# Patient Record
Sex: FEMALE | Race: WHITE | Employment: STUDENT | URBAN - METROPOLITAN AREA
[De-identification: names, ages, dates, MRNs, and addresses within clinical notes are randomized per-mention and may not be internally consistent; named-entity substitution may affect disease eponyms.]

---
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
## 2018-01-26 DIAGNOSIS — R10.84 ABDOMINAL PAIN, GENERALIZED: ICD-10-CM

## 2018-01-26 LAB
ALBUMIN SERPL-MCNC: 3.3 G/DL (ref 3.4–5)
ALP SERPL-CCNC: 50 U/L (ref 40–150)
ALT SERPL W P-5'-P-CCNC: 21 U/L (ref 0–50)
AST SERPL W P-5'-P-CCNC: 18 U/L (ref 0–35)
BASOPHILS # BLD AUTO: 0 10E9/L (ref 0–0.2)
BASOPHILS NFR BLD AUTO: 0.7 %
BILIRUB DIRECT SERPL-MCNC: 0.2 MG/DL (ref 0–0.2)
BILIRUB SERPL-MCNC: 0.7 MG/DL (ref 0.2–1.3)
DEPRECATED CALCIDIOL+CALCIFEROL SERPL-MC: 50 UG/L (ref 20–75)
DIFFERENTIAL METHOD BLD: NORMAL
EOSINOPHIL # BLD AUTO: 0.2 10E9/L (ref 0–0.7)
EOSINOPHIL NFR BLD AUTO: 4 %
ERYTHROCYTE [DISTWIDTH] IN BLOOD BY AUTOMATED COUNT: 12.5 % (ref 10–15)
HCT VFR BLD AUTO: 43 % (ref 35–47)
HGB BLD-MCNC: 13.7 G/DL (ref 11.7–15.7)
IMM GRANULOCYTES # BLD: 0 10E9/L (ref 0–0.4)
IMM GRANULOCYTES NFR BLD: 0.2 %
LYMPHOCYTES # BLD AUTO: 1.6 10E9/L (ref 0.8–5.3)
LYMPHOCYTES NFR BLD AUTO: 36.1 %
MCH RBC QN AUTO: 30.4 PG (ref 26.5–33)
MCHC RBC AUTO-ENTMCNC: 31.9 G/DL (ref 31.5–36.5)
MCV RBC AUTO: 96 FL (ref 78–100)
MONOCYTES # BLD AUTO: 0.3 10E9/L (ref 0–1.3)
MONOCYTES NFR BLD AUTO: 6.8 %
NEUTROPHILS # BLD AUTO: 2.4 10E9/L (ref 1.6–8.3)
NEUTROPHILS NFR BLD AUTO: 52.2 %
NRBC # BLD AUTO: 0 10*3/UL
NRBC BLD AUTO-RTO: 0 /100
PLATELET # BLD AUTO: 206 10E9/L (ref 150–450)
PROT SERPL-MCNC: 7 G/DL (ref 6.8–8.8)
RBC # BLD AUTO: 4.5 10E12/L (ref 3.8–5.2)
TSH SERPL DL<=0.005 MIU/L-ACNC: 1.17 MU/L (ref 0.4–4)
WBC # BLD AUTO: 4.5 10E9/L (ref 4–11)

## 2018-01-27 LAB
ANION GAP SERPL CALCULATED.3IONS-SCNC: 7 MMOL/L (ref 3–14)
BUN SERPL-MCNC: 17 MG/DL (ref 7–30)
CALCIUM SERPL-MCNC: 8.7 MG/DL (ref 8.5–10.1)
CHLORIDE SERPL-SCNC: 109 MMOL/L (ref 96–110)
CO2 SERPL-SCNC: 26 MMOL/L (ref 20–32)
CREAT SERPL-MCNC: 1.09 MG/DL (ref 0.5–1)
GFR SERPL CREATININE-BSD FRML MDRD: 64 ML/MIN/1.7M2
GLUCOSE SERPL-MCNC: 72 MG/DL (ref 70–99)
POTASSIUM SERPL-SCNC: 4.4 MMOL/L (ref 3.4–5.3)
SODIUM SERPL-SCNC: 142 MMOL/L (ref 133–144)

## 2018-01-29 ENCOUNTER — OFFICE VISIT (OUTPATIENT)
Dept: FAMILY MEDICINE | Facility: CLINIC | Age: 18
End: 2018-01-29
Payer: COMMERCIAL

## 2018-01-29 VITALS
HEART RATE: 87 BPM | WEIGHT: 152.4 LBS | HEIGHT: 68 IN | BODY MASS INDEX: 23.1 KG/M2 | DIASTOLIC BLOOD PRESSURE: 77 MMHG | SYSTOLIC BLOOD PRESSURE: 118 MMHG

## 2018-01-29 DIAGNOSIS — L73.8 FOLLICULITIS BARBAE: Primary | ICD-10-CM

## 2018-01-29 RX ORDER — SULFAMETHOXAZOLE/TRIMETHOPRIM 800-160 MG
1 TABLET ORAL 2 TIMES DAILY
Qty: 14 TABLET | Refills: 0 | Status: SHIPPED | OUTPATIENT
Start: 2018-01-29 | End: 2018-03-30

## 2018-01-29 NOTE — PROGRESS NOTES
"SUBJECTIVE  HPI:  Berna Nichols is a 17 year old female who presents to the clinic today for some irritated skin and subdermal nodules in her left axilla.  She has not treated with warm compress, and shaves her armpits 2 times per week with a straight razor  Symptoms have been present for about 3 weeks  She thinks it is getting a little better, and has a large nodule/pustule in her left armpit today    Patient Active Problem List   Diagnosis     Oligomenorrhea     Acne vulgaris     Shoulder instability, right       Patient Active Problem List 01/29/2018  (No Known Allergies)   - Bert as Reviewed 01/29/2018      Current Outpatient Prescriptions   Medication Sig Dispense Refill     sulfamethoxazole-trimethoprim (BACTRIM DS/SEPTRA DS) 800-160 MG per tablet Take 1 tablet by mouth 2 times daily 14 tablet 0     traMADol (ULTRAM) 50 MG tablet Take 1-2 tablets ( mg) by mouth every 6 hours as needed for moderate pain (Patient not taking: Reported on 1/29/2018) 6 tablet 0     norethindrone-ethinyl estradiol-iron (ESTROSTEP FE) 1-20/1-30/1-35 MG-MCG per tablet Take 1 tablet by mouth daily         EXAM:   VITALS: /77  Pulse 87  Ht 1.727 m (5' 8\")  Wt 69.1 kg (152 lb 6.4 oz)  BMI 23.17 kg/m2  General:healthy, alert and no distress  Rash description:     Location: arm, upper     Distribution: localized     Lesion grouping: clustered     Lesion type: nodular and papular     Color: skin color  Nail exam:normal- no clubbing or nail changes  Hair exam: NEGATIVE    PERTINENT EXAM: GENERAL healthy, alert and no distress  EYES EOMI, intact visual fields, PERRL and funduscopic deferred  HENT: Normocephalic. TM's grossly normal, oropharynx without significant findings.  NECK: NEGATIVE  RESP: Clear to auscultation  CV: NEGATIVE    ASSESSMENT / IMPRESSION:  18 yo female with left axiilary folliculitis barbae    PLAN:  Warm compress PRN  Use antibacterial body wash  Bactrim ds x 7 days  F/u in 1 week  Dr Caldera      "

## 2018-01-29 NOTE — LETTER
"  1/29/2018      RE: Berna Nichols  [address] [facility] [address]       SUBJECTIVE  HPI:  Berna Nichols is a 17 year old female who presents to the clinic today for some irritated skin and subdermal nodules in her left axilla.  She has not treated with warm compress, and shaves her armpits 2 times per week with a straight razor  Symptoms have been present for about 3 weeks  She thinks it is getting a little better, and has a large nodule/pustule in her left armpit today    Patient Active Problem List   Diagnosis     Oligomenorrhea     Acne vulgaris     Shoulder instability, right       Patient Active Problem List 01/29/2018  (No Known Allergies)   - Bert as Reviewed 01/29/2018      Current Outpatient Prescriptions   Medication Sig Dispense Refill     sulfamethoxazole-trimethoprim (BACTRIM DS/SEPTRA DS) 800-160 MG per tablet Take 1 tablet by mouth 2 times daily 14 tablet 0     traMADol (ULTRAM) 50 MG tablet Take 1-2 tablets ( mg) by mouth every 6 hours as needed for moderate pain (Patient not taking: Reported on 1/29/2018) 6 tablet 0     norethindrone-ethinyl estradiol-iron (ESTROSTEP FE) 1-20/1-30/1-35 MG-MCG per tablet Take 1 tablet by mouth daily         EXAM:   VITALS: /77  Pulse 87  Ht 1.727 m (5' 8\")  Wt 69.1 kg (152 lb 6.4 oz)  BMI 23.17 kg/m2  General:healthy, alert and no distress  Rash description:     Location: arm, upper     Distribution: localized     Lesion grouping: clustered     Lesion type: nodular and papular     Color: skin color  Nail exam:normal- no clubbing or nail changes  Hair exam: NEGATIVE    PERTINENT EXAM: GENERAL healthy, alert and no distress  EYES EOMI, intact visual fields, PERRL and funduscopic deferred  HENT: Normocephalic. TM's grossly normal, oropharynx without significant findings.  NECK: NEGATIVE  RESP: Clear to auscultation  CV: NEGATIVE    ASSESSMENT / IMPRESSION:  18 yo female with left axiilary folliculitis barbae    PLAN:  Warm compress PRN  Use " antibacterial body wash  Bactrim ds x 7 days  F/u in 1 week  Dr Caldera

## 2018-01-29 NOTE — MR AVS SNAPSHOT
"              After Visit Summary   1/29/2018    Berna Nichols    MRN: [id number]           Patient Information     Date Of Birth          2000        Visit Information        Provider Department      1/29/2018 5:15 PM Moses Caldera MD [facility]        Today's Diagnoses     Folliculitis barbae    -  1       Follow-ups after your visit        Who to contact     Please call your clinic at [phone] to:    Ask questions about your health    Make or cancel appointments    Discuss your medicines    Learn about your test results    Speak to your doctor   If you have compliments or concerns about an experience at your clinic, or if you wish to file a complaint, please contact [facility] Patient Relations at [phone] or email us at [email or web address]         Additional Information About Your Visit        MyChart Information     [employer] gives you secure access to your electronic health record. If you see a primary care provider, you can also send messages to your care team and make appointments. If you have questions, please call your primary care clinic.  If you do not have a primary care provider, please call [phone] and they will assist you.      [employer] (1BOG) is an electronic gateway that provides easy, online access to your medical records. With [employer] (1BOG), you can request a clinic appointment, read your test results, renew a prescription or communicate with your care team.     To access your existing account, please contact your [facility] or call [phone] for assistance.        Care EveryWhere ID     This is your Care EveryWhere ID. This could be used by other organizations to access your [place] medical records  [id number]        Your Vitals Were     Pulse Height BMI (Body Mass Index)             87 1.727 m (5' 8\") 23.17 kg/m2          Blood Pressure from Last 3 Encounters:   01/29/18 118/77 "   12/12/17 109/76   12/21/16 127/88    Weight from Last 3 Encounters:   01/29/18 69.1 kg (152 lb 6.4 oz) (82 %)*   12/14/17 68.9 kg (152 lb) (82 %)*   12/12/17 68 kg (150 lb) (80 %)*     * Growth percentiles are based on [facility] 2-20 Years data.              Today, you had the following     No orders found for display         Today's Medication Changes          These changes are accurate as of 1/29/18  5:36 PM.  If you have any questions, ask your nurse or doctor.               Start taking these medicines.        Dose/Directions    sulfamethoxazole-trimethoprim 800-160 MG per tablet   Commonly known as:  BACTRIM DS/SEPTRA DS   Used for:  Folliculitis barbae   Started by:  Moses Caldera MD        Dose:  1 tablet   Take 1 tablet by mouth 2 times daily   Quantity:  14 tablet   Refills:  0            Where to get your medicines      These medications were sent to [place] [address] [place] [address]  [address] [place] [address]     Phone:  [phone]     sulfamethoxazole-trimethoprim 800-160 MG per tablet                Primary Care Provider Office Phone # Fax #    Karina Lieberman [phone] [address] [place] [address]        Equal Access to Services     ANTHONY TENORIO AH: Hadii eleanor soto hadasho Soomaali, waaxda luqadaha, qaybta kaalmada adeegyada, waxay keisha priest. So [facility] [phone].    ATENCIÓN: Si habla español, tiene a davila disposición servicios gratuitos de asistencia lingüística. Llame al [phone].    We comply with applicable federal civil rights laws and [place] laws. We do not discriminate on the basis of race, color, national origin, age, disability, sex, sexual orientation, or gender identity.            Thank you!     Thank you for choosing [facility]  for your care. Our goal is always to provide you with excellent care. Hearing back from our patients is one way we can continue to improve our  services. Please take a few minutes to complete the written survey that you may receive in the mail after your visit with us. Thank you!             Your Updated Medication List - Protect others around you: Learn how to safely use, store and throw away your medicines at www.disposemymeds.org.          This list is accurate as of 1/29/18  5:36 PM.  Always use your most recent med list.                   Brand Name Dispense Instructions for use Diagnosis    norethindrone-ethinyl estradiol-iron 1-20/1-30/1-35 MG-MCG per tablet    ESTROSTEP FE     Take 1 tablet by mouth daily        sulfamethoxazole-trimethoprim 800-160 MG per tablet    BACTRIM DS/SEPTRA DS    14 tablet    Take 1 tablet by mouth 2 times daily    Folliculitis barbae       traMADol 50 MG tablet    ULTRAM    6 tablet    Take 1-2 tablets ( mg) by mouth every 6 hours as needed for moderate pain    Patellar tendinitis of right knee

## 2018-02-13 ENCOUNTER — OFFICE VISIT (OUTPATIENT)
Dept: FAMILY MEDICINE | Facility: CLINIC | Age: 18
End: 2018-02-13
Payer: COMMERCIAL

## 2018-02-13 VITALS
DIASTOLIC BLOOD PRESSURE: 71 MMHG | WEIGHT: 150 LBS | SYSTOLIC BLOOD PRESSURE: 120 MMHG | HEIGHT: 68 IN | HEART RATE: 93 BPM | BODY MASS INDEX: 22.73 KG/M2

## 2018-02-13 DIAGNOSIS — R22.9 SKIN NODULE: ICD-10-CM

## 2018-02-13 DIAGNOSIS — R10.84 ABDOMINAL PAIN, GENERALIZED: Primary | ICD-10-CM

## 2018-02-13 NOTE — PROGRESS NOTES
"S: Here for f/u diffuse abdominal pain. Pain is intermittent, notes that these may possibly be related to eating, but not each time. Has had times where she gets symptoms 1 hour after eating.     Has had 2-3 in the past month.     Prior labs from 1/28/18 show normal LFTs. No imaging done.     States she would like to vomit when these happen but no diarrhea, nausea, constipation.     No dark/bloody urine. No dysuria. No change with periods.     Last episode last Thurs, states this persisted through the night (~12 hrs).     Also reports her nodules on her axilla are not improved.    O: /71  Pulse 93  Ht 1.727 m (5' 8\")  Wt 68 kg (150 lb)  BMI 22.81 kg/m2  GEN: NAD  Abdominal: Soft, NT, ND. No guarding. No distension. No fluid wave  Axilla: 3 x nodules palpable in L axilla    A/P: 16 yo F here for evaluation of diffuse, intermittent abdominal pain. No trend obvious in her history, but the intermittent, crampy nature of it is suspicious for cholelithiasis.     She has no active symptoms today. And prior blood work showed no abnormality but she was not having symptoms when this was done.     She appears stable on exam today, but she requires further evaluation given that her symptoms are progressing mildly (given prolonged episode of abdominal pain).     Will obtain imaging and ask that she f/u. If US is negative can consider CT abdomen    Also, axillary nodules not improved. Will get US axilla.     Patient seen and patient examined with staff MD Dr Armas and Marcia Redman ATC.     Jaime Medina MD  Primary Care Sports Medicine Fellow  February 13, 2018    "

## 2018-02-13 NOTE — LETTER
"  2/13/2018      RE: Brena Nichols  [address] [facility] [place] [address]       S: Here for f/u diffuse abdominal pain. Pain is intermittent, notes that these may possibly be related to eating, but not each time. Has had times where she gets symptoms 1 hour after eating.     Has had 2-3 in the past month.     Prior labs from 1/28/18 show normal LFTs. No imaging done.     States she would like to vomit when these happen but no diarrhea, nausea, constipation.     No dark/bloody urine. No dysuria. No change with periods.     Last episode last Thurs, states this persisted through the night (~12 hrs).     Also reports her nodules on her axilla are not improved.    O: /71  Pulse 93  Ht 1.727 m (5' 8\")  Wt 68 kg (150 lb)  BMI 22.81 kg/m2  GEN: NAD  Abdominal: Soft, NT, ND. No guarding. No distension. No fluid wave  Axilla: 3 x nodules palpable in L axilla    A/P: 18 yo F here for evaluation of diffuse, intermittent abdominal pain. No trend obvious in her history, but the intermittent, crampy nature of it is suspicious for cholelithiasis.     She has no active symptoms today. And prior blood work showed no abnormality but she was not having symptoms when this was done.     She appears stable on exam today, but she requires further evaluation given that her symptoms are progressing mildly (given prolonged episode of abdominal pain).     Will obtain imaging and ask that she f/u. If US is negative can consider CT abdomen    Also, axillary nodules not improved. Will get US axilla.     Patient seen and patient examined with staff MD Dr Armas and Marcia Redman ATC.     Jaime Medina MD  Primary Care Sports Medicine Fellow  February 13, 2018      Attending Note:   I have personally examined this patient and have reviewed the clinical presentation and progress note with the fellow. I agree with the treatment plan as outlined. The plan was formulated with the fellow on the day of the patient's visit.   Brittny CONNELL" MD Chanda, CAQ, CCD  [facility]

## 2018-02-13 NOTE — MR AVS SNAPSHOT
"              After Visit Summary   2/13/2018    Berna Nichols    MRN: [id number]           Patient Information     Date Of Birth          2000        Visit Information        Provider Department      2/13/2018 9:00 AM Brittny Armas MD [facility]        Today's Diagnoses     Abdominal pain, generalized    -  1    Skin nodule           Follow-ups after your visit        Who to contact     Please call your clinic at [phone] to:    Ask questions about your health    Make or cancel appointments    Discuss your medicines    Learn about your test results    Speak to your doctor            Additional Information About Your Visit        MyChart Information     [employer] gives you secure access to your electronic health record. If you see a primary care provider, you can also send messages to your care team and make appointments. If you have questions, please call your primary care clinic.  If you do not have a primary care provider, please call [phone] and they will assist you.      IT'SUGAR is an electronic gateway that provides easy, online access to your medical records. With [employer], you can request a clinic appointment, read your test results, renew a prescription or communicate with your care team.     To access your existing account, please contact your [facility] or call [phone] for assistance.        Care EveryWhere ID     This is your Care EveryWhere ID. This could be used by other organizations to access your [place] medical records  [id number]        Your Vitals Were     Pulse Height BMI (Body Mass Index)             93 5' 8\" (1.727 m) 22.81 kg/m2          Blood Pressure from Last 3 Encounters:   02/27/18 110/72   02/13/18 120/71   01/29/18 118/77    Weight from Last 3 Encounters:   02/27/18 150 lb (68 kg)   02/13/18 150 lb (68 kg) (80 %)*   01/29/18 152 lb 6.4 oz (69.1 kg) (82 %)*     * Growth percentiles are based on " [facility] 2-20 Years data.               Primary Care Provider Office Phone # Fax #    Karina Lieberman [phone]       [address] [place] [address]        Equal Access to Services     ANTHONY TENORIO : Hadmayuri eleanor soto kirano Sofranki, waaxda luqadaha, qaybta kaalmada moise, anup hart laJunehien priest. So [facility] [phone].    ATENCIÓN: Si habla español, tiene a davila disposición servicios gratuitos de asistencia lingüística. Llame al [phone].    We comply with applicable federal civil rights laws and [place] laws. We do not discriminate on the basis of race, color, national origin, age, disability, sex, sexual orientation, or gender identity.            Thank you!     Thank you for choosing [facility]  for your care. Our goal is always to provide you with excellent care. Hearing back from our patients is one way we can continue to improve our services. Please take a few minutes to complete the written survey that you may receive in the mail after your visit with us. Thank you!             Your Updated Medication List - Protect others around you: Learn how to safely use, store and throw away your medicines at www.disposemymeds.org.          This list is accurate as of 2/13/18 11:59 PM.  Always use your most recent med list.                   Brand Name Dispense Instructions for use Diagnosis    norethindrone-ethinyl estradiol-iron 1-20/1-30/1-35 MG-MCG per tablet    ESTROSTEP FE     Take 1 tablet by mouth daily        sulfamethoxazole-trimethoprim 800-160 MG per tablet    BACTRIM DS/SEPTRA DS    14 tablet    Take 1 tablet by mouth 2 times daily    Folliculitis barbae       traMADol 50 MG tablet    ULTRAM    6 tablet    Take 1-2 tablets ( mg) by mouth every 6 hours as needed for moderate pain    Patellar tendinitis of right knee

## 2018-02-23 ENCOUNTER — RADIANT APPOINTMENT (OUTPATIENT)
Dept: ULTRASOUND IMAGING | Facility: CLINIC | Age: 18
End: 2018-02-23
Attending: FAMILY MEDICINE
Payer: COMMERCIAL

## 2018-02-23 DIAGNOSIS — R22.9 SKIN NODULE: ICD-10-CM

## 2018-02-23 DIAGNOSIS — R10.84 ABDOMINAL PAIN, GENERALIZED: ICD-10-CM

## 2018-02-27 ENCOUNTER — OFFICE VISIT (OUTPATIENT)
Dept: FAMILY MEDICINE | Facility: CLINIC | Age: 18
End: 2018-02-27
Payer: COMMERCIAL

## 2018-02-27 VITALS
HEART RATE: 87 BPM | SYSTOLIC BLOOD PRESSURE: 110 MMHG | HEIGHT: 68 IN | WEIGHT: 150 LBS | BODY MASS INDEX: 22.73 KG/M2 | DIASTOLIC BLOOD PRESSURE: 72 MMHG

## 2018-02-27 DIAGNOSIS — R10.84 ABDOMINAL PAIN, GENERALIZED: Primary | ICD-10-CM

## 2018-02-27 DIAGNOSIS — R59.0 ENLARGED LYMPH NODES IN ARMPIT: ICD-10-CM

## 2018-02-27 NOTE — PROGRESS NOTES
"S: 19 yo F here for follow up generalized abdominal pain and for L axillary LN.     In brief, she has had US of both areas. US abdomen resulted as normal. US of L axilla showed findings c/w lymph node.     Reports she is feeling better and that she has not had as severe abdominal pain as prior. Does still report milder symptoms but these are not debilitating.     No n/v. No dark stools or BRBPR. No dysuria     O: /72  Pulse 87  Ht 1.727 m (5' 8\")  Wt 68 kg (150 lb)  LMP 02/20/2018 (Approximate)  BMI 22.81 kg/m2    GEN: NAD. AAOx3  L axilla: Palpable LN masses, smaller than prior.   ABD: Mild epigastric TTP. No palpable masses. No fluid wave. Soft.     A/P: 19 yo F presenting for chronic, intermittent vague abdominal pain. Initial work up negative thus far. Jeanmarie does have evidence of improvement (decreased symptoms, smaller lymph nodes in axilla) but her constellation of symptoms is concerning given that she has had no prior reason to have reactive lymphadenopathy in her axilla.     Will refer to surgery for consideration of biopsy of the nodes.     With concurrent LA and generalized abdominal pain will obtain CT Abd/pelvis to evaluate for further potential pathology.     For follow up s/p CT.     Patient seen and case discussed with staff MD Dr Armas and BANDAR Redman.     Jaime Medina MD  Primary Care Sports Medicine Fellow  Feb 27, 2018    "

## 2018-02-27 NOTE — MR AVS SNAPSHOT
After Visit Summary   2/27/2018    Berna Nichols    MRN: [id number]           Patient Information     Date Of Birth          2000        Visit Information        Provider Department      2/27/2018 9:15 AM Brittny Armas MD [facility]        Today's Diagnoses     Abdominal pain, generalized    -  1    Enlarged lymph nodes in armpit           Follow-ups after your visit        Additional Services     GENERAL SURG ADULT REFERRAL       Your provider has referred you to: [facility]: General Surgery Clinic [facility] [phone]   [email or web address]    Please be aware that coverage of these services is subject to the terms and limitations of your health insurance plan.  Call member services at your health plan with any benefit or coverage questions.      Please bring the following with you to your appointment:    (1) Any X-Rays, CTs or MRIs which have been performed.  Contact the facility where they were done to arrange for  prior to your scheduled appointment.   (2) List of current medications   (3) This referral request   (4) Any documents/labs given to you for this referral                  Who to contact     Please call your clinic at [phone] to:    Ask questions about your health    Make or cancel appointments    Discuss your medicines    Learn about your test results    Speak to your doctor            Additional Information About Your Visit        [employer] Information     [employer] gives you secure access to your electronic health record. If you see a primary care provider, you can also send messages to your care team and make appointments. If you have questions, please call your primary care clinic.  If you do not have a primary care provider, please call [phone] and they will assist you.      [employer] is an electronic gateway that provides easy, online access to your medical records. With [employer], you  "can request a clinic appointment, read your test results, renew a prescription or communicate with your care team.     To access your existing account, please contact your [facility] or call [phone] for assistance.        Care EveryWhere ID     This is your Care EveryWhere ID. This could be used by other organizations to access your [place] medical records  [id number]        Your Vitals Were     Pulse Height Last Period BMI (Body Mass Index)          87 5' 8\" (1.727 m) 02/20/2018 (Approximate) 22.81 kg/m2         Blood Pressure from Last 3 Encounters:   02/27/18 110/72   02/13/18 120/71   01/29/18 118/77    Weight from Last 3 Encounters:   02/27/18 150 lb (68 kg)   02/13/18 150 lb (68 kg) (80 %)*   01/29/18 152 lb 6.4 oz (69.1 kg) (82 %)*     * Growth percentiles are based on CDC 2-20 Years data.              We Performed the Following     GENERAL SURG ADULT REFERRAL        Primary Care Provider Office Phone # Fax #    Karina Lieberman [phone] [address] [place] [address]        Equal Access to Services     [facility]: Hadii eleanor becko Sofranki, waaxda luastrid, qaybta kaalmada adeyennyyada, anup lozano . So [facility] [phone].    ATENCIÓN: Si habla español, tiene a davila disposición servicios gratuitos de asistencia lingüística. Llame al [phone].    We comply with applicable federal civil rights laws and [place] laws. We do not discriminate on the basis of race, color, national origin, age, disability, sex, sexual orientation, or gender identity.            Thank you!     Thank you for choosing [facility]  for your care. Our goal is always to provide you with excellent care. Hearing back from our patients is one way we can continue to improve our services. Please take a few minutes to complete the written survey that you may receive in the mail after your visit with us. Thank you!             Your " Updated Medication List - Protect others around you: Learn how to safely use, store and throw away your medicines at www.disposemymeds.org.          This list is accurate as of 2/27/18 11:59 PM.  Always use your most recent med list.                   Brand Name Dispense Instructions for use Diagnosis    norethindrone-ethinyl estradiol-iron 1-20/1-30/1-35 MG-MCG per tablet    ESTROSTEP FE     Take 1 tablet by mouth daily        sulfamethoxazole-trimethoprim 800-160 MG per tablet    BACTRIM DS/SEPTRA DS    14 tablet    Take 1 tablet by mouth 2 times daily    Folliculitis barbae       traMADol 50 MG tablet    ULTRAM    6 tablet    Take 1-2 tablets ( mg) by mouth every 6 hours as needed for moderate pain    Patellar tendinitis of right knee

## 2018-02-27 NOTE — LETTER
"  2/27/2018      RE: Berna Nichols  [address] [facility] [address]       S: 17 yo F here for follow up generalized abdominal pain and for L axillary LN.     In brief, she has had US of both areas. US abdomen resulted as normal. US of L axilla showed findings c/w lymph node.     Reports she is feeling better and that she has not had as severe abdominal pain as prior. Does still report milder symptoms but these are not debilitating.     No n/v. No dark stools or BRBPR. No dysuria     O: /72  Pulse 87  Ht 1.727 m (5' 8\")  Wt 68 kg (150 lb)  LMP 02/20/2018 (Approximate)  BMI 22.81 kg/m2    GEN: NAD. AAOx3  L axilla: Palpable LN masses, smaller than prior.   ABD: Mild epigastric TTP. No palpable masses. No fluid wave. Soft.     A/P: 17 yo F presenting for chronic, intermittent vague abdominal pain. Initial work up negative thus far. Jeanmarie does have evidence of improvement (decreased symptoms, smaller lymph nodes in axilla) but her constellation of symptoms is concerning given that she has had no prior reason to have reactive lymphadenopathy in her axilla.     Will refer to surgery for consideration of biopsy of the nodes.     With concurrent LA and generalized abdominal pain will obtain CT Abd/pelvis to evaluate for further potential pathology.     For follow up s/p CT.     Patient seen and case discussed with staff MD Dr Armas and BANDAR Redman.     Jaime Medina MD  Primary Care Sports Medicine Fellow  Feb 27, 2018      Attending Note:   I have personally examined this patient and have reviewed the clinical presentation and progress note with the fellow. I agree with the treatment plan as outlined. The plan was formulated with the fellow on the day of the patient's visit. I have reviewed all imaging with the fellow and agree with the findings in the documentation.     Brittny Armas MD, CAQ, CCD  [facility]  Sports Medicine and Bone Health      "

## 2018-03-06 NOTE — PROGRESS NOTES
Attending Note:   I have personally examined this patient and have reviewed the clinical presentation and progress note with the fellow. I agree with the treatment plan as outlined. The plan was formulated with the fellow on the day of the patient's visit.   Brittny Armas MD, CAQ, CCD  [facility]  Sports Medicine and Bone Health

## 2018-03-06 NOTE — PROGRESS NOTES
Attending Note:   I have personally examined this patient and have reviewed the clinical presentation and progress note with the fellow. I agree with the treatment plan as outlined. The plan was formulated with the fellow on the day of the patient's visit. I have reviewed all imaging with the fellow and agree with the findings in the documentation.     Brittny Armas MD, CAQ, CCD  [facility]  Sports Medicine and Bone Health

## 2018-03-29 ENCOUNTER — TELEPHONE (OUTPATIENT)
Dept: SURGERY | Facility: CLINIC | Age: 18
End: 2018-03-29

## 2018-03-29 NOTE — TELEPHONE ENCOUNTER
Pre Visit Call and Assessment    Date of call:  03/29/2018    Phone numbers:  Home number on file [phone] (home)    Reached patient/confirmed appointment:  No - left message:   on voicemail  Patient care team/Primary provider:  Karina Lieberman  Referred to:  Dr. Vanessa Rucker    Reason for visit: Lymph node biopsy

## 2018-03-30 ENCOUNTER — OFFICE VISIT (OUTPATIENT)
Dept: SURGERY | Facility: CLINIC | Age: 18
End: 2018-03-30
Payer: COMMERCIAL

## 2018-03-30 VITALS
OXYGEN SATURATION: 94 % | HEART RATE: 78 BPM | SYSTOLIC BLOOD PRESSURE: 113 MMHG | TEMPERATURE: 97.9 F | HEIGHT: 68 IN | WEIGHT: 155.2 LBS | BODY MASS INDEX: 23.52 KG/M2 | DIASTOLIC BLOOD PRESSURE: 69 MMHG

## 2018-03-30 DIAGNOSIS — R59.1 LYMPHADENOPATHY: Primary | ICD-10-CM

## 2018-03-30 ASSESSMENT — ENCOUNTER SYMPTOMS
BLOOD IN STOOL: 0
RECTAL PAIN: 0
VOMITING: 0
NAUSEA: 0
BLOATING: 1
ABDOMINAL PAIN: 1
BOWEL INCONTINENCE: 0
DIARRHEA: 0
HEARTBURN: 0
JAUNDICE: 0
CONSTIPATION: 0

## 2018-03-30 ASSESSMENT — PAIN SCALES - GENERAL: PAINLEVEL: NO PAIN (0)

## 2018-03-30 NOTE — NURSING NOTE
"Chief Complaint   Patient presents with     Mass     New patient consultation for biopsy of enlarge axillary lymph node.        Vitals:    03/30/18 1323   BP: 113/69   BP Location: Left arm   Patient Position: Chair   Cuff Size: Adult Regular   Pulse: 78   Temp: 97.9  F (36.6  C)   TempSrc: Oral   SpO2: 94%   Weight: 155 lb 3.2 oz   Height: 5' 8\"       Body mass index is 23.6 kg/(m^2).  Curt X, LPN                     "

## 2018-03-30 NOTE — PATIENT INSTRUCTIONS
Dr. Rucker has placed an order for an US of your left axillary- to be done in 3 months. Should would also like to see you back in clinic after this has been done. If you would like, our office will call you to help this appointment.    Please call [phone], option #3, with questions.

## 2018-03-30 NOTE — LETTER
3/30/2018       RE: Berna Nichols  [address] [facility] [address]     Dear Colleague,    Thank you for referring your patient, Berna Nichols, to the [facility] GENERAL SURGERY at [facility]. Please see a copy of my visit note below.    General Surgery Clinic Note - New Patient Visit    NAME: Berna Nichols,  18 year old female    PCP: Karina Lieberman  MRN:   [id number]      #: [phone]  Date: Mar 30, 2018    Chief Complaint: mass in left armpit    History of Present Illness: Berna Nichols is a 18 year old female who presents to the clinic with a mass in the left armpit. In January, she was seen in the clinic with a folliculitis in that armpit. Now, her PCP noted an enlarged lymph node in the same armpit.  It has not broken open and drained.   She thinks it is getting smaller.  She is also being worked up by her PCP for abdominal pain.    Past Medical History: History in Epic reviewed with the patient:  No past medical history on file.    Past Surgical History: History in Epic reviewed with the patient:  Past Surgical History:   Procedure Laterality Date     ARTHROSCOPIC RECONSTRUCTION ANTERIOR CRUCIATE LIGAMENT Left 12/21/2016     ARTHROSCOPIC RECONSTRUCTION ANTERIOR CRUCIATE LIGAMENT Left 12/21/2016    Procedure: ARTHROSCOPIC RECONSTRUCTION ANTERIOR CRUCIATE LIGAMENT;  Surgeon: Domenico Hartman MD;  Location: [facility] OR       Family History: History in Epic reviewed with the patient:  No family history on file.    Social History: History in Epic reviewed with the patient:  Social History     Social History     Marital status: Single     Spouse name: N/A     Number of children: N/A     Years of education: N/A     Occupational History     Not on file.     Social History Main Topics     Smoking status: Never Smoker     Smokeless tobacco: Never Used     Alcohol use No     Drug use: No     Sexual activity: Yes     Partners: Male     Birth control/ protection:  "Pill     Other Topics Concern     Not on file     Social History Narrative     Allergies:   No Known Allergies    Outpatient Medications:  Outpatient Encounter Prescriptions as of 3/30/2018   Medication Sig Dispense Refill     norethindrone-ethinyl estradiol-iron (ESTROSTEP FE) 1-20/1-30/1-35 MG-MCG per tablet Take 1 tablet by mouth daily       [DISCONTINUED] sulfamethoxazole-trimethoprim (BACTRIM DS/SEPTRA DS) 800-160 MG per tablet Take 1 tablet by mouth 2 times daily (Patient not taking: Reported on 2/13/2018) 14 tablet 0     [DISCONTINUED] traMADol (ULTRAM) 50 MG tablet Take 1-2 tablets ( mg) by mouth every 6 hours as needed for moderate pain (Patient not taking: Reported on 1/29/2018) 6 tablet 0     No facility-administered encounter medications on file as of 3/30/2018.        ROS: 10 systems reviewed and all are negative except as above.Answers for HPI/ROS submitted by the patient on 3/30/2018   General Symptoms: No  Skin Symptoms: No  HENT Symptoms: No  EYE SYMPTOMS: No  HEART SYMPTOMS: No  LUNG SYMPTOMS: No  INTESTINAL SYMPTOMS: Yes  URINARY SYMPTOMS: No  GYNECOLOGIC SYMPTOMS: No  BREAST SYMPTOMS: No  SKELETAL SYMPTOMS: No  BLOOD SYMPTOMS: No  NERVOUS SYSTEM SYMPTOMS: No  MENTAL HEALTH SYMPTOMS: No  Heart burn or indigestion: No  Nausea: No  Vomiting: No  Abdominal pain: Yes  Bloating: Yes  Constipation: No  Diarrhea: No  Blood in stool: No  Black stools: No  Rectal or Anal pain: No  Fecal incontinence: No  Yellowing of skin or eyes: No  Vomit with blood: No  Change in stools: No    EXAM:  /69 (BP Location: Left arm, Patient Position: Chair, Cuff Size: Adult Regular)  Pulse 78  Temp 97.9  F (36.6  C) (Oral)  Ht 1.727 m (5' 8\")  Wt 70.4 kg (155 lb 3.2 oz)  SpO2 94%  BMI 23.6 kg/m2  Psych: Normal affect  Neuro: No gross focal deficits noted  Head:Normocephalic, atraumatic  Eyes: Non icteric  Neck:supple  Heart: Regular rate and rhythm  Lungs: non-labored, quiet respiration  Abdomen: " flat  Extremities: Small scar in the left armpit and healing ridge.  Deep to that, and near the chest wall, there is a mobile rubbery palpable lymph node about 1.5 cm.     Imaging: an axillary US performed 2/23/2018 showed:  IMPRESSION:  There is a prominent morphologically normal appearing axillary lymph node at the area of clinical concern in the left axilla.  .    A/P: Berna Nichols is a 18 year old female with lymph adenopathy after folliculitis of the left axilla.  This does not appear worrisome and the story offers a plausible benign explanation. The US is reassuring. With the LN getting smaller overall, I think it is safe to continue to observe the area.  We will get another US of the axilla in 3 mo and I will see her after that.to be certain it has resolved.     Again, thank you for allowing me to participate in the care of your patient.      Sincerely,    Vanessa Rucker MD

## 2018-03-30 NOTE — MR AVS SNAPSHOT
After Visit Summary   3/30/2018    Berna Nichols    MRN: [id number]           Patient Information     Date Of Birth          2000        Visit Information        Provider Department      3/30/2018 1:30 PM Vanessa Rucker MD [facility] Surgery        Today's Diagnoses     Lymphadenopathy    -  1      Care Instructions    Dr. Rucker has placed an order for an US of your left axillary- to be done in 3 months. Should would also like to see you back in clinic after this has been done. If you would like, our office will call you to help this appointment.    Please call [phone], option #3, with questions.            Follow-ups after your visit        Future tests that were ordered for you today     Open Future Orders        Priority Expected Expires Ordered    US Axillary Left Routine  3/30/2019 3/30/2018            Who to contact     Please call your clinic at [phone] to:    Ask questions about your health    Make or cancel appointments    Discuss your medicines    Learn about your test results    Speak to your doctor            Additional Information About Your Visit        [employer] Information     [employer] gives you secure access to your electronic health record. If you see a primary care provider, you can also send messages to your care team and make appointments. If you have questions, please call your primary care clinic.  If you do not have a primary care provider, please call [phone] and they will assist you.      [employer] is an electronic gateway that provides easy, online access to your medical records. With [employer], you can request a clinic appointment, read your test results, renew a prescription or communicate with your care team.     To access your existing account, please contact your [facility] or call [phone] for assistance.        Care EveryWhere ID     This is your Care EveryWhere ID. This could be used by other  "organizations to access your [place] medical records  [id number]        Your Vitals Were     Pulse Temperature Height Pulse Oximetry BMI (Body Mass Index)       78 97.9  F (36.6  C) (Oral) 1.727 m (5' 8\") 94% 23.6 kg/m2        Blood Pressure from Last 3 Encounters:   03/30/18 113/69   02/27/18 110/72   02/13/18 120/71    Weight from Last 3 Encounters:   03/30/18 70.4 kg (155 lb 3.2 oz)   02/27/18 68 kg (150 lb)   02/13/18 68 kg (150 lb) (80 %)*     * Growth percentiles are based on CDC 2-20 Years data.               Primary Care Provider Office Phone # Fax #    Karina Lieberman [phone] [address] [place] [address]        Equal Access to Services     [facility] Center[facility] : Teresa Cat, waaxjessica hui, elizabeth kaalraina phipps, anup lozano . So [facility] [phone].    ATENCIÓN: Si habla español, tiene a davila disposición servicios gratuitos de asistencia lingüística. Llame al [phone].    We comply with applicable federal civil rights laws and [place] laws. We do not discriminate on the basis of race, color, national origin, age, disability, sex, sexual orientation, or gender identity.            Thank you!     Thank you for choosing [facility]  for your care. Our goal is always to provide you with excellent care. Hearing back from our patients is one way we can continue to improve our services. Please take a few minutes to complete the written survey that you may receive in the mail after your visit with us. Thank you!             Your Updated Medication List - Protect others around you: Learn how to safely use, store and throw away your medicines at www.disposemymeds.org.          This list is accurate as of 3/30/18  2:43 PM.  Always use your most recent med list.                   Brand Name Dispense Instructions for use Diagnosis    norethindrone-ethinyl estradiol-iron 1-20/1-30/1-35 MG-MCG per tablet    ESTROSTEP FE     Take 1 tablet " by mouth daily

## 2018-04-06 NOTE — PROGRESS NOTES
General Surgery Clinic Note - New Patient Visit    NAME: Berna Nichols,  18 year old female    PCP: Karina Lieberman  MRN:   [id number]      #: [phone]  Date: Mar 30, 2018    Chief Complaint: mass in left armpit    History of Present Illness: Berna Nichols is a 18 year old female who presents to the clinic with a mass in the left armpit. In January, she was seen in the clinic with a folliculitis in that armpit. Now, her PCP noted an enlarged lymph node in the same armpit.  It has not broken open and drained.   She thinks it is getting smaller.  She is also being worked up by her PCP for abdominal pain.    Past Medical History: History in Epic reviewed with the patient:  No past medical history on file.    Past Surgical History: History in Epic reviewed with the patient:  Past Surgical History:   Procedure Laterality Date     ARTHROSCOPIC RECONSTRUCTION ANTERIOR CRUCIATE LIGAMENT Left 12/21/2016     ARTHROSCOPIC RECONSTRUCTION ANTERIOR CRUCIATE LIGAMENT Left 12/21/2016    Procedure: ARTHROSCOPIC RECONSTRUCTION ANTERIOR CRUCIATE LIGAMENT;  Surgeon: Dmoenico Hartman MD;  Location[facility] OR       Family History: History in Epic reviewed with the patient:  No family history on file.    Social History: History in Epic reviewed with the patient:  Social History     Social History     Marital status: Single     Spouse name: N/A     Number of children: N/A     Years of education: N/A     Occupational History     Not on file.     Social History Main Topics     Smoking status: Never Smoker     Smokeless tobacco: Never Used     Alcohol use No     Drug use: No     Sexual activity: Yes     Partners: Male     Birth control/ protection: Pill     Other Topics Concern     Not on file     Social History Narrative     Allergies:   No Known Allergies    Outpatient Medications:  Outpatient Encounter Prescriptions as of 3/30/2018   Medication Sig Dispense Refill     norethindrone-ethinyl estradiol-iron (ESTROSTEP FE)  "1-20/1-30/1-35 MG-MCG per tablet Take 1 tablet by mouth daily       [DISCONTINUED] sulfamethoxazole-trimethoprim (BACTRIM DS/SEPTRA DS) 800-160 MG per tablet Take 1 tablet by mouth 2 times daily (Patient not taking: Reported on 2/13/2018) 14 tablet 0     [DISCONTINUED] traMADol (ULTRAM) 50 MG tablet Take 1-2 tablets ( mg) by mouth every 6 hours as needed for moderate pain (Patient not taking: Reported on 1/29/2018) 6 tablet 0     No facility-administered encounter medications on file as of 3/30/2018.        ROS: 10 systems reviewed and all are negative except as above.Answers for HPI/ROS submitted by the patient on 3/30/2018   General Symptoms: No  Skin Symptoms: No  HENT Symptoms: No  EYE SYMPTOMS: No  HEART SYMPTOMS: No  LUNG SYMPTOMS: No  INTESTINAL SYMPTOMS: Yes  URINARY SYMPTOMS: No  GYNECOLOGIC SYMPTOMS: No  BREAST SYMPTOMS: No  SKELETAL SYMPTOMS: No  BLOOD SYMPTOMS: No  NERVOUS SYSTEM SYMPTOMS: No  MENTAL HEALTH SYMPTOMS: No  Heart burn or indigestion: No  Nausea: No  Vomiting: No  Abdominal pain: Yes  Bloating: Yes  Constipation: No  Diarrhea: No  Blood in stool: No  Black stools: No  Rectal or Anal pain: No  Fecal incontinence: No  Yellowing of skin or eyes: No  Vomit with blood: No  Change in stools: No    EXAM:  /69 (BP Location: Left arm, Patient Position: Chair, Cuff Size: Adult Regular)  Pulse 78  Temp 97.9  F (36.6  C) (Oral)  Ht 1.727 m (5' 8\")  Wt 70.4 kg (155 lb 3.2 oz)  SpO2 94%  BMI 23.6 kg/m2  Psych: Normal affect  Neuro: No gross focal deficits noted  Head:Normocephalic, atraumatic  Eyes: Non icteric  Neck:supple  Heart: Regular rate and rhythm  Lungs: non-labored, quiet respiration  Abdomen: flat  Extremities: Small scar in the left armpit and healing ridge.  Deep to that, and near the chest wall, there is a mobile rubbery palpable lymph node about 1.5 cm.     Imaging: an axillary US performed 2/23/2018 showed:  IMPRESSION:  There is a prominent morphologically normal " appearing axillary lymph node at the area of clinical concern in the left axilla.  .    A/P: Berna Nichols is a 18 year old female with lymph adenopathy after folliculitis of the left axilla.  This does not appear worrisome and the story offers a plausible benign explanation. The US is reassuring. With the LN getting smaller overall, I think it is safe to continue to observe the area.  We will get another US of the axilla in 3 mo and I will see her after that.to be certain it has resolved.     Vanessa Rucker MD FACS  Associate Professor of Surgery  Pager [phone]

## 2018-04-10 ENCOUNTER — OFFICE VISIT (OUTPATIENT)
Dept: FAMILY MEDICINE | Facility: CLINIC | Age: 18
End: 2018-04-10
Payer: COMMERCIAL

## 2018-04-10 VITALS
HEIGHT: 68 IN | DIASTOLIC BLOOD PRESSURE: 82 MMHG | SYSTOLIC BLOOD PRESSURE: 115 MMHG | BODY MASS INDEX: 22.73 KG/M2 | WEIGHT: 150 LBS | HEART RATE: 88 BPM

## 2018-04-10 DIAGNOSIS — M25.562 ACUTE PAIN OF LEFT KNEE: Primary | ICD-10-CM

## 2018-04-10 NOTE — LETTER
"  4/10/2018      RE: Berna CAMPO Wosergio  [address] [facility] [address]       S:  Berna is a 18 year old  with history of prior L knee ACL reconstruction with BTB autograph in 2016 who presents with concerns of intermittent left knee discomfort x3 weeks.  Prior ACL repair was uncomplicated and isolated.  She did have some post-surgical patellar tendinopathy.  She now states that the team was playing trashball in the basketball arena 3 weeks ago when she noted her left knee felt funny.  She noticed some popping when turning and that was recurrent.  No one event that started it. Some soreness in the back and medially.  It was again sore 2 days ago and this lasted into the next day.  She then rested it yesterday and today had complete resolution of her discomfort.  She completed all of her lifts including squatting today without issue.  No swelling or effusions have been noted.    O: /82  Pulse 88  Ht 5' 8\" (1.727 m)  Wt 150 lb (68 kg)  BMI 22.81 kg/m2   CONSTITUTIONAL: healthy, alert and no distress  SKIN: no suspicious lesions or rashes  GAIT: normal  NEUROLOGIC: Non-focal  PSYCHIATRIC: affect normal/bright and mentation appears normal.  MSK: L knee without obvious effusion, bruising, redness, or deformity. Post-surgical BTB scarring, well healed.  Range of motion of the affected leg 0-140  when compared to 0-140  contralaterally. No tenderness of bilateral joint lines. No significant tenderness over the patellar or quadriceps tendon, pes anserine bursa, fibular head, MCL, or FCL. No crepitus with patellar grind and there is normal patellar tracking. Patient has 5/5 strength bilaterally.  Normal Lachman's, anterior and posterior drawer.  Firm ligamentous endpoints with varus and valgus stressors. Negative McMurrays and Thessaly's.     A: Left anterior knee pain    P: Discussed that examination findings today are reassuring as is her recent resolution of symptoms.  Symptoms could be due " to increased impact activity over skating in the setting of prior BTB graft. Recommend avoiding offending activities.  If recurrence, consider repeat exam and considering imaging. Patient and hockey AT, Marcia endorsed understanding and agreement with the above plan    Patient seen and discussed with MD Jaime Fragoso MD  Primary Care Sports Medicine Fellow        Attending Note:   I have personally examined this patient and have reviewed the clinical presentation and progress note with the fellow. I agree with the treatment plan as outlined. The plan was formulated with the fellow on the day of the patient's visit.    Brittny Armas MD, CAQ, CCD  [facility]  Sports Medicine and Bone Health    Brittny Armas MD

## 2018-04-10 NOTE — PROGRESS NOTES
"S:  Berna is a 18 year old  with history of prior L knee ACL reconstruction with BTB autograph in 2016 who presents with concerns of intermittent left knee discomfort x3 weeks.  Prior ACL repair was uncomplicated and isolated.  She did have some post-surgical patellar tendinopathy.  She now states that the team was playing trashball in the basketball arena 3 weeks ago when she noted her left knee felt funny.  She noticed some popping when turning and that was recurrent.  No one event that started it. Some soreness in the back and medially.  It was again sore 2 days ago and this lasted into the next day.  She then rested it yesterday and today had complete resolution of her discomfort.  She completed all of her lifts including squatting today without issue.  No swelling or effusions have been noted.    O: /82  Pulse 88  Ht 5' 8\" (1.727 m)  Wt 150 lb (68 kg)  BMI 22.81 kg/m2   CONSTITUTIONAL: healthy, alert and no distress  SKIN: no suspicious lesions or rashes  GAIT: normal  NEUROLOGIC: Non-focal  PSYCHIATRIC: affect normal/bright and mentation appears normal.  MSK: L knee without obvious effusion, bruising, redness, or deformity. Post-surgical BTB scarring, well healed.  Range of motion of the affected leg 0-140  when compared to 0-140  contralaterally. No tenderness of bilateral joint lines. No significant tenderness over the patellar or quadriceps tendon, pes anserine bursa, fibular head, MCL, or FCL. No crepitus with patellar grind and there is normal patellar tracking. Patient has 5/5 strength bilaterally.  Normal Lachman's, anterior and posterior drawer.  Firm ligamentous endpoints with varus and valgus stressors. Negative McMurrays and Thessaly's.     A: Left anterior knee pain    P: Discussed that examination findings today are reassuring as is her recent resolution of symptoms.  Symptoms could be due to increased impact activity over skating in the setting of prior BTB graft. " Recommend avoiding offending activities.  If recurrence, consider repeat exam and considering imaging. Patient and hockey AT, Marcia endorsed understanding and agreement with the above plan    Patient seen and discussed with MD Jaime Fragoso MD  Primary Care Sports Medicine Fellow

## 2018-04-10 NOTE — MR AVS SNAPSHOT
"              After Visit Summary   4/10/2018    Berna Nichols    MRN: [id number]           Patient Information     Date Of Birth          2000        Visit Information        Provider Department      4/10/2018 9:30 AM Brittny Armas MD [facility]        Today's Diagnoses     Acute pain of left knee    -  1       Follow-ups after your visit        Who to contact     Please call your clinic at [phone] to:    Ask questions about your health    Make or cancel appointments    Discuss your medicines    Learn about your test results    Speak to your doctor            Additional Information About Your Visit        MyChart Information     [employer] gives you secure access to your electronic health record. If you see a primary care provider, you can also send messages to your care team and make appointments. If you have questions, please call your primary care clinic.  If you do not have a primary care provider, please call [phone] and they will assist you.      [employer] is an electronic gateway that provides easy, online access to your medical records. With [employer], you can request a clinic appointment, read your test results, renew a prescription or communicate with your care team.     To access your existing account, please contact your [facility] or call [phone] for assistance.        Care EveryWhere ID     This is your Care EveryWhere ID. This could be used by other organizations to access your [place] medical records  [id number]        Your Vitals Were     Pulse Height BMI (Body Mass Index)             88 1.727 m (5' 8\") 22.81 kg/m2          Blood Pressure from Last 3 Encounters:   04/10/18 115/82   03/30/18 113/69   02/27/18 110/72    Weight from Last 3 Encounters:   04/10/18 68 kg (150 lb)   03/30/18 70.4 kg (155 lb 3.2 oz)   02/27/18 68 kg (150 lb)              Today, you had the following     No orders found for display    "    Primary Care Provider Office Phone # Fax #    Karina Lieberman [phone]       [address] [place] [address]        Equal Access to Services     ANTHONY TENORIO : Hadii aad ku hadramirorickie Shalondafranki, brookjessica hannajesseha, elizabeth kacarmenda moise, anup hart laJuenhien priest. So [facility] [phone].    ATENCIÓN: Si habla español, tiene a davila disposición servicios gratuitos de asistencia lingüística. Llame al [phone].    We comply with applicable federal civil rights laws and [place] laws. We do not discriminate on the basis of race, color, national origin, age, disability, sex, sexual orientation, or gender identity.            Thank you!     Thank you for choosing [facility]  for your care. Our goal is always to provide you with excellent care. Hearing back from our patients is one way we can continue to improve our services. Please take a few minutes to complete the written survey that you may receive in the mail after your visit with us. Thank you!             Your Updated Medication List - Protect others around you: Learn how to safely use, store and throw away your medicines at www.disposemymeds.org.          This list is accurate as of 4/10/18 11:59 PM.  Always use your most recent med list.                   Brand Name Dispense Instructions for use Diagnosis    norethindrone-ethinyl estradiol-iron 1-20/1-30/1-35 MG-MCG per tablet    ESTROSTEP FE     Take 1 tablet by mouth daily

## 2018-04-11 NOTE — PROGRESS NOTES
Attending Note:   I have personally examined this patient and have reviewed the clinical presentation and progress note with the fellow. I agree with the treatment plan as outlined. The plan was formulated with the fellow on the day of the patient's visit.    Brittny Armas MD, CAQ, CCD  [facility]  Sports Medicine and Bone Health

## 2018-05-02 ENCOUNTER — RADIANT APPOINTMENT (OUTPATIENT)
Dept: MRI IMAGING | Facility: CLINIC | Age: 18
End: 2018-05-02
Attending: FAMILY MEDICINE
Payer: COMMERCIAL

## 2018-05-02 DIAGNOSIS — M25.562 LEFT KNEE PAIN: ICD-10-CM

## 2018-05-09 ENCOUNTER — OFFICE VISIT (OUTPATIENT)
Dept: ORTHOPEDICS | Facility: CLINIC | Age: 18
End: 2018-05-09
Payer: COMMERCIAL

## 2018-05-09 DIAGNOSIS — M23.204 DEGENERATIVE TEAR OF LEFT MEDIAL MENISCUS: Primary | ICD-10-CM

## 2018-05-09 NOTE — LETTER
5/9/2018      RE: Berna Nichols  [address] [place] [id number]       Service Date: 05/09/2018      CHIEF COMPLAINT:  Left knee pain.      HISTORY OF PRESENT ILLNESS:  Jeanmarie Nichols is a 18-year-old [place]  who underwent left ACL reconstruction 12/21/2016.  She was doing quite well until recently.  She developed left knee pain.  She has some slight swelling.  However, Jeanmarie is able to play hockey.  She feels that her knee does bother her a small amount but this is mostly with some strength training activity.  She has had a recent MRI.      PHYSICAL EXAMINATION:  Evaluation of the left knee reveals full range of motion.  No obvious effusion.  She is tender on the medial joint line, nontender on the lateral joint line.  She has a negative Lachman.      MR IMAGING:  I personally reviewed the patient's MRI.  She does have an anterior horn meniscus tear with a very small flap of meniscal tissue.  Her ACL graft is intact.  Her articular cartilage graft is intact.      IMPRESSION:  18-year-old [place]  with a small anterior horn medial meniscus tear.  Jeanmarie and I had a long conversation about her knee today.  I explained that this is an unusual location for meniscus pathology.  It may turn out that her symptoms quiet down with time.  Alternatively, we could consider surgical intervention.  We discussed arthroscopy.  Jeanmarie does have some travel plans this spring.  We have decided to allow her to advance her activity and see how she does over the next few weeks.  If her symptoms are resolved, then we will allow her to advance her hockey activity.  However, if she persists with symptoms, then we will perform a left knee arthroscopy and partial medial meniscectomy.      PLAN:   1.  Continue hockey activities as tolerated.   2.  Follow up with me from when she returns from her trip to discuss treatment options.        I spent 15 minutes with this patient, 10 minutes dedicated to counseling,  education and development of a treatment plan.      cc:   Brittny Armas MD         [facility]         SATINDER MANN MD             D: 2018   T: 2018   MT: MANJU      Name:     PHOEBE GRISSOM   MRN:      [id number]        Account:      [id number]   :      2000           Service Date: 2018      Document: [id number]       Satinder Mann MD

## 2018-05-09 NOTE — MR AVS SNAPSHOT
After Visit Summary   5/9/2018    Berna Nichols    MRN: [id number]           Patient Information     Date Of Birth          2000        Visit Information        Provider Department      5/9/2018 7:45 PM Domenico Hartman MD [facility]        Today's Diagnoses     Degenerative tear of left medial meniscus    -  1       Follow-ups after your visit        Who to contact     Please call your clinic at [phone] to:    Ask questions about your health    Make or cancel appointments    Discuss your medicines    Learn about your test results    Speak to your doctor            Additional Information About Your Visit        MyChart Information     [employer] gives you secure access to your electronic health record. If you see a primary care provider, you can also send messages to your care team and make appointments. If you have questions, please call your primary care clinic.  If you do not have a primary care provider, please call [phone] and they will assist you.      [employer] is an electronic gateway that provides easy, online access to your medical records. With [employer], you can request a clinic appointment, read your test results, renew a prescription or communicate with your care team.     To access your existing account, please contact your [facility] or call [phone] for assistance.        Care EveryWhere ID     This is your Care EveryWhere ID. This could be used by other organizations to access your [facility] medical records  [id number]         Blood Pressure from Last 3 Encounters:   04/10/18 115/82   03/30/18 113/69   02/27/18 110/72    Weight from Last 3 Encounters:   04/10/18 150 lb (68 kg)   03/30/18 155 lb 3.2 oz (70.4 kg)   02/27/18 150 lb (68 kg)              Today, you had the following     No orders found for display       Primary Care Provider Office Phone # Fax #    Karina Lieberman [phone] [address] [place] [place] [address]        Equal Access to Services     [facility] Campus[facility] : Hadii aad ku hadramirorickie Cat, wasandritajessica hui, federicaanup lara. So [facility] [phone].    ATENCIÓN: Si habla español, tiene a davila disposición servicios gratuitos de asistencia lingüística. LlGalion Hospital [phone].    We comply with applicable federal civil rights laws and [place] laws. We do not discriminate on the basis of race, color, national origin, age, disability, sex, sexual orientation, or gender identity.            Thank you!     Thank you for choosing [facility]  for your care. Our goal is always to provide you with excellent care. Hearing back from our patients is one way we can continue to improve our services. Please take a few minutes to complete the written survey that you may receive in the mail after your visit with us. Thank you!             Your Updated Medication List - Protect others around you: Learn how to safely use, store and throw away your medicines at www.disposemymeds.org.          This list is accurate as of 5/9/18 11:59 PM.  Always use your most recent med list.                   Brand Name Dispense Instructions for use Diagnosis    norethindrone-ethinyl estradiol-iron 1-20/1-30/1-35 MG-MCG per tablet    ESTROSTEP FE     Take 1 tablet by mouth daily

## 2018-05-14 NOTE — PROGRESS NOTES
Service Date: 05/09/2018      CHIEF COMPLAINT:  Left knee pain.      HISTORY OF PRESENT ILLNESS:  Jeanmarie Nichols is a 18-year-old [place]  who underwent left ACL reconstruction 12/21/2016.  She was doing quite well until recently.  She developed left knee pain.  She has some slight swelling.  However, Jeanmarie is able to play hockey.  She feels that her knee does bother her a small amount but this is mostly with some strength training activity.  She has had a recent MRI.      PHYSICAL EXAMINATION:  Evaluation of the left knee reveals full range of motion.  No obvious effusion.  She is tender on the medial joint line, nontender on the lateral joint line.  She has a negative Lachman.      MR IMAGING:  I personally reviewed the patient's MRI.  She does have an anterior horn meniscus tear with a very small flap of meniscal tissue.  Her ACL graft is intact.  Her articular cartilage graft is intact.      IMPRESSION:  18-year-old [place]  with a small anterior horn medial meniscus tear.  Jeanmarie and I had a long conversation about her knee today.  I explained that this is an unusual location for meniscus pathology.  It may turn out that her symptoms quiet down with time.  Alternatively, we could consider surgical intervention.  We discussed arthroscopy.  Jeanmarie does have some travel plans this spring.  We have decided to allow her to advance her activity and see how she does over the next few weeks.  If her symptoms are resolved, then we will allow her to advance her hockey activity.  However, if she persists with symptoms, then we will perform a left knee arthroscopy and partial medial meniscectomy.      PLAN:   1.  Continue hockey activities as tolerated.   2.  Follow up with me from when she returns from her trip to discuss treatment options.        I spent 15 minutes with this patient, 10 minutes dedicated to counseling, education and development of a treatment plan.      cc:   MD [facility]         SATINDER MANN MD             D: 2018   T: 2018   MT: MANJU      Name:     PHOEBE GRISSOM   MRN:      [id number]        Account:      [id number]   :      2000           Service Date: 2018      Document: [id number]

## 2018-05-16 DIAGNOSIS — S83.232D COMPLEX TEAR OF MEDIAL MENISCUS OF LEFT KNEE AS CURRENT INJURY, SUBSEQUENT ENCOUNTER: Primary | ICD-10-CM

## 2018-05-25 ENCOUNTER — OFFICE VISIT (OUTPATIENT)
Dept: FAMILY MEDICINE | Facility: CLINIC | Age: 18
End: 2018-05-25
Payer: COMMERCIAL

## 2018-05-25 VITALS
BODY MASS INDEX: 22.58 KG/M2 | WEIGHT: 149 LBS | SYSTOLIC BLOOD PRESSURE: 122 MMHG | DIASTOLIC BLOOD PRESSURE: 71 MMHG | HEART RATE: 81 BPM | HEIGHT: 68 IN

## 2018-05-25 DIAGNOSIS — S83.242S TEAR OF MEDIAL MENISCUS OF LEFT KNEE, CURRENT, UNSPECIFIED TEAR TYPE, SEQUELA: ICD-10-CM

## 2018-05-25 DIAGNOSIS — Z01.818 PRE-OP EXAM: Primary | ICD-10-CM

## 2018-05-25 NOTE — PROGRESS NOTES
"NAME: Berna iNchols      AGE: 18 year old      SEX: female  Chief Complaint/Reason for Procedure: left knee meniscus repair    Indications: left knee medial meniscus tear  Surgeon:  Devan  Date of Surgery: 6/6/18  Location: left knee     HPI: left knee meniscus repair, injury occurred about 2 months ago. Plan for surgery for repair    PAST HISTORY  Past Surgical History:   Procedure Laterality Date     ARTHROSCOPIC RECONSTRUCTION ANTERIOR CRUCIATE LIGAMENT Left 12/21/2016     ARTHROSCOPIC RECONSTRUCTION ANTERIOR CRUCIATE LIGAMENT Left 12/21/2016    Procedure: ARTHROSCOPIC RECONSTRUCTION ANTERIOR CRUCIATE LIGAMENT;  Surgeon: Domenico Hartman MD;  Location: [facility]       History reviewed. No pertinent past medical history.    MEDICATIONS:  Current Outpatient Prescriptions   Medication Sig Dispense Refill     norethindrone-ethinyl estradiol-iron (ESTROSTEP FE) 1-20/1-30/1-35 MG-MCG per tablet Take 1 tablet by mouth daily         ALLERGIES  Review of patient's allergies indicates no known allergies.    SOCIAL HISTORY  Social History   Substance Use Topics     Smoking status: Never Smoker     Smokeless tobacco: Never Used     Alcohol use No       FAMILY HISTORY  History reviewed. No pertinent family history.    Family History of Anesthesia Reaction: No  Family History of Bleeding Disorder:  No    REVIEW OF SYSTEMS  Constitutional, HEENT, cardiovascular, pulmonary, gi and gu systems are negative, except as otherwise noted.    PHYSICAL EXAM  /71  Pulse 81  Ht 1.727 m (5' 8\")  Wt 67.6 kg (149 lb)  LMP 05/18/2018  BMI 22.66 kg/m2  General Appearance: Normal  Skin:  Normal  Head:  Normal  Eyes: Normal  Ears: Normal  Nose: Normal  Mouth/Teeth: Normal  Throat: Normal  Neck: Normal  Chest/Lungs: Normal  Heart/Vascular: Normal  Abdomen: Normal  Skeletal: Normal, except medial knee joint pain with flexion and palpation.  Lymphoid: Normal  Blood Vessels: Normal  Neuromuscular: Normal  Other: " Normal      Assessment: 17 yo female with left knee meniscus tear, pre-op exam    PLAN  This patient has been examined by me this day and has been found to be an acceptable candidate for surgery with apppropriate anesthesia.  Moses Caldera   5/25/2018

## 2018-05-25 NOTE — LETTER
"  5/25/2018      RE: Berna Nichols  [address] [facility] [address]       NAME: Berna Nichols      AGE: 18 year old      SEX: female  Chief Complaint/Reason for Procedure: left knee meniscus repair    Indications: left knee medial meniscus tear  Surgeon:  Devan  Date of Surgery: 6/6/18  Location: left knee     HPI: left knee meniscus repair, injury occurred about 2 months ago. Plan for surgery for repair    PAST HISTORY  Past Surgical History:   Procedure Laterality Date     ARTHROSCOPIC RECONSTRUCTION ANTERIOR CRUCIATE LIGAMENT Left 12/21/2016     ARTHROSCOPIC RECONSTRUCTION ANTERIOR CRUCIATE LIGAMENT Left 12/21/2016    Procedure: ARTHROSCOPIC RECONSTRUCTION ANTERIOR CRUCIATE LIGAMENT;  Surgeon: Domenico Hartman MD;  [facility]       History reviewed. No pertinent past medical history.    MEDICATIONS:  Current Outpatient Prescriptions   Medication Sig Dispense Refill     norethindrone-ethinyl estradiol-iron (ESTROSTEP FE) 1-20/1-30/1-35 MG-MCG per tablet Take 1 tablet by mouth daily         ALLERGIES  Review of patient's allergies indicates no known allergies.    SOCIAL HISTORY  Social History   Substance Use Topics     Smoking status: Never Smoker     Smokeless tobacco: Never Used     Alcohol use No       FAMILY HISTORY  History reviewed. No pertinent family history.    Family History of Anesthesia Reaction: No  Family History of Bleeding Disorder:  No    REVIEW OF SYSTEMS  Constitutional, HEENT, cardiovascular, pulmonary, gi and gu systems are negative, except as otherwise noted.    PHYSICAL EXAM  /71  Pulse 81  Ht 1.727 m (5' 8\")  Wt 67.6 kg (149 lb)  LMP 05/18/2018  BMI 22.66 kg/m2  General Appearance: Normal  Skin:  Normal  Head:  Normal  Eyes: Normal  Ears: Normal  Nose: Normal  Mouth/Teeth: Normal  Throat: Normal  Neck: Normal  Chest/Lungs: Normal  Heart/Vascular: Normal  Abdomen: Normal  Skeletal: Normal, except medial knee joint pain with flexion and palpation.  Lymphoid: " Normal  Blood Vessels: Normal  Neuromuscular: Normal  Other: Normal      Assessment: 17 yo female with left knee meniscus tear, pre-op exam    PLAN  This patient has been examined by me this day and has been found to be an acceptable candidate for surgery with apppropriate anesthesia.  Moses Caldera   5/25/2018        Moses Caldera MD

## 2018-05-25 NOTE — MR AVS SNAPSHOT
After Visit Summary   5/25/2018    Berna Nichols    MRN: [id number]           Patient Information     Date Of Birth          2000        Visit Information        Provider Department      5/25/2018 10:45 AM Moses Caldera MD [facility]        Today's Diagnoses     Pre-op exam    -  1    Tear of medial meniscus of left knee, current, unspecified tear type, sequela           Follow-ups after your visit        Your next 10 appointments already scheduled     Jun 06, 2018   Procedure with Domenico Hartman MD   [facility] ([facility])    [address] [place] [address]  5th Floor  [facility] [address] [phone]           Located in the Clinics and Surgery Center at [address] [place] [address].   parking is very convenient and highly recommended.  is a $6 flat rate fee.  Both  and self parkers should enter the main arrival plaza from [facility]; parking attendants will direct you based on your parking preference.              Who to contact     Please call your clinic at [phone] to:    Ask questions about your health    Make or cancel appointments    Discuss your medicines    Learn about your test results    Speak to your doctor            Additional Information About Your Visit        [employer] Information     [facility] gives you secure access to your electronic health record. If you see a primary care provider, you can also send messages to your care team and make appointments. If you have questions, please call your primary care clinic.  If you do not have a primary care provider, please call [phone] and they will assist you.      [facility] is an electronic gateway that provides easy, online access to your medical records. With [facility], you can request a clinic appointment, read your test results, renew a prescription or communicate with your care team.     To access your existing  "account, please contact your [facility] or call [phone] for assistance.        Care EveryWhere ID     This is your Care EveryWhere ID. This could be used by other organizations to access your [place] medical records  [id number]        Your Vitals Were     Pulse Height Last Period BMI (Body Mass Index)          81 1.727 m (5' 8\") 05/18/2018 22.66 kg/m2         Blood Pressure from Last 3 Encounters:   05/25/18 122/71   04/10/18 115/82   03/30/18 113/69    Weight from Last 3 Encounters:   05/25/18 67.6 kg (149 lb)   04/10/18 68 kg (150 lb)   03/30/18 70.4 kg (155 lb 3.2 oz)              Today, you had the following     No orders found for display       Primary Care Provider Office Phone # Fax #    Karina Lieberman [phone]       [address] [place] [place] [address]        Equal Access to Services     LESLY TENORIO : Hadii aad ku hadasho Soomaali, waaxda luqadaha, qaybta kaalmada adeegyada, anup hollisin hima lozano . So [facility] [phone].    ATENCIÓN: Si marlene dugan, tiene a davila disposición servicios gratuitos de asistencia lingüística. Llame al [phone].    We comply with applicable federal civil rights laws and [place] laws. We do not discriminate on the basis of race, color, national origin, age, disability, sex, sexual orientation, or gender identity.            Thank you!     Thank you for choosing [facility]  for your care. Our goal is always to provide you with excellent care. Hearing back from our patients is one way we can continue to improve our services. Please take a few minutes to complete the written survey that you may receive in the mail after your visit with us. Thank you!             Your Updated Medication List - Protect others around you: Learn how to safely use, store and throw away your medicines at www.disposemymeds.org.          This list is accurate as of 5/25/18  2:53 PM.  Always use your most recent med " list.                   Brand Name Dispense Instructions for use Diagnosis    norethindrone-ethinyl estradiol-iron 1-20/1-30/1-35 MG-MCG per tablet    ESTROSTEP FE     Take 1 tablet by mouth daily

## 2018-06-05 ENCOUNTER — ANESTHESIA EVENT (OUTPATIENT)
Dept: SURGERY | Facility: AMBULATORY SURGERY CENTER | Age: 18
End: 2018-06-05

## 2018-06-06 ENCOUNTER — SURGERY (OUTPATIENT)
Age: 18
End: 2018-06-06

## 2018-06-06 ENCOUNTER — ANESTHESIA (OUTPATIENT)
Dept: SURGERY | Facility: AMBULATORY SURGERY CENTER | Age: 18
End: 2018-06-06

## 2018-06-06 ENCOUNTER — HOSPITAL ENCOUNTER (OUTPATIENT)
Facility: AMBULATORY SURGERY CENTER | Age: 18
End: 2018-06-06
Attending: ORTHOPAEDIC SURGERY
Payer: COMMERCIAL

## 2018-06-06 VITALS
HEART RATE: 51 BPM | HEIGHT: 68 IN | RESPIRATION RATE: 12 BRPM | WEIGHT: 150 LBS | OXYGEN SATURATION: 100 % | TEMPERATURE: 97.8 F | BODY MASS INDEX: 22.73 KG/M2 | DIASTOLIC BLOOD PRESSURE: 75 MMHG | SYSTOLIC BLOOD PRESSURE: 109 MMHG

## 2018-06-06 DIAGNOSIS — M25.569 KNEE PAIN, UNSPECIFIED CHRONICITY, UNSPECIFIED LATERALITY: Primary | ICD-10-CM

## 2018-06-06 DIAGNOSIS — Z98.890 S/P ARTHROSCOPIC KNEE SURGERY: ICD-10-CM

## 2018-06-06 LAB
HCG UR QL: NEGATIVE
INTERNAL QC OK POCT: YES

## 2018-06-06 RX ORDER — FENTANYL CITRATE 50 UG/ML
INJECTION, SOLUTION INTRAMUSCULAR; INTRAVENOUS PRN
Status: DISCONTINUED | OUTPATIENT
Start: 2018-06-06 | End: 2018-06-06

## 2018-06-06 RX ORDER — SODIUM CHLORIDE, SODIUM LACTATE, POTASSIUM CHLORIDE, CALCIUM CHLORIDE 600; 310; 30; 20 MG/100ML; MG/100ML; MG/100ML; MG/100ML
INJECTION, SOLUTION INTRAVENOUS CONTINUOUS
Status: DISCONTINUED | OUTPATIENT
Start: 2018-06-06 | End: 2018-06-07 | Stop reason: HOSPADM

## 2018-06-06 RX ORDER — HYDROCODONE BITARTRATE AND ACETAMINOPHEN 5; 325 MG/1; MG/1
1-2 TABLET ORAL EVERY 4 HOURS PRN
Qty: 20 TABLET | Refills: 0 | Status: SHIPPED | OUTPATIENT
Start: 2018-06-06 | End: 2018-06-13

## 2018-06-06 RX ORDER — NALOXONE HYDROCHLORIDE 0.4 MG/ML
.1-.4 INJECTION, SOLUTION INTRAMUSCULAR; INTRAVENOUS; SUBCUTANEOUS
Status: DISCONTINUED | OUTPATIENT
Start: 2018-06-06 | End: 2018-06-07 | Stop reason: HOSPADM

## 2018-06-06 RX ORDER — PROPOFOL 10 MG/ML
INJECTION, EMULSION INTRAVENOUS CONTINUOUS PRN
Status: DISCONTINUED | OUTPATIENT
Start: 2018-06-06 | End: 2018-06-06

## 2018-06-06 RX ORDER — FENTANYL CITRATE 50 UG/ML
25-50 INJECTION, SOLUTION INTRAMUSCULAR; INTRAVENOUS
Status: DISCONTINUED | OUTPATIENT
Start: 2018-06-06 | End: 2018-06-07 | Stop reason: HOSPADM

## 2018-06-06 RX ORDER — AMOXICILLIN 250 MG
1-2 CAPSULE ORAL 2 TIMES DAILY
Qty: 30 TABLET | Refills: 0 | Status: SHIPPED | OUTPATIENT
Start: 2018-06-06 | End: 2018-06-13

## 2018-06-06 RX ORDER — BUPIVACAINE HYDROCHLORIDE AND EPINEPHRINE 2.5; 5 MG/ML; UG/ML
INJECTION, SOLUTION INFILTRATION; PERINEURAL PRN
Status: DISCONTINUED | OUTPATIENT
Start: 2018-06-06 | End: 2018-06-06 | Stop reason: HOSPADM

## 2018-06-06 RX ORDER — HYDROCODONE BITARTRATE AND ACETAMINOPHEN 5; 325 MG/1; MG/1
1-2 TABLET ORAL EVERY 4 HOURS PRN
Qty: 30 TABLET | Refills: 0 | Status: SHIPPED | OUTPATIENT
Start: 2018-06-06 | End: 2018-06-13

## 2018-06-06 RX ORDER — DEXAMETHASONE SODIUM PHOSPHATE 4 MG/ML
INJECTION, SOLUTION INTRA-ARTICULAR; INTRALESIONAL; INTRAMUSCULAR; INTRAVENOUS; SOFT TISSUE PRN
Status: DISCONTINUED | OUTPATIENT
Start: 2018-06-06 | End: 2018-06-06

## 2018-06-06 RX ORDER — ACETAMINOPHEN 325 MG/1
975 TABLET ORAL ONCE
Status: COMPLETED | OUTPATIENT
Start: 2018-06-06 | End: 2018-06-06

## 2018-06-06 RX ORDER — ONDANSETRON 4 MG/1
4 TABLET, ORALLY DISINTEGRATING ORAL EVERY 30 MIN PRN
Status: DISCONTINUED | OUTPATIENT
Start: 2018-06-06 | End: 2018-06-07 | Stop reason: HOSPADM

## 2018-06-06 RX ORDER — ONDANSETRON 2 MG/ML
4 INJECTION INTRAMUSCULAR; INTRAVENOUS EVERY 30 MIN PRN
Status: DISCONTINUED | OUTPATIENT
Start: 2018-06-06 | End: 2018-06-07 | Stop reason: HOSPADM

## 2018-06-06 RX ORDER — ONDANSETRON 2 MG/ML
INJECTION INTRAMUSCULAR; INTRAVENOUS PRN
Status: DISCONTINUED | OUTPATIENT
Start: 2018-06-06 | End: 2018-06-06

## 2018-06-06 RX ORDER — KETOROLAC TROMETHAMINE 30 MG/ML
INJECTION, SOLUTION INTRAMUSCULAR; INTRAVENOUS PRN
Status: DISCONTINUED | OUTPATIENT
Start: 2018-06-06 | End: 2018-06-06

## 2018-06-06 RX ORDER — PROPOFOL 10 MG/ML
INJECTION, EMULSION INTRAVENOUS PRN
Status: DISCONTINUED | OUTPATIENT
Start: 2018-06-06 | End: 2018-06-06

## 2018-06-06 RX ORDER — MORPHINE SULFATE 0.5 MG/ML
INJECTION, SOLUTION EPIDURAL; INTRATHECAL; INTRAVENOUS PRN
Status: DISCONTINUED | OUTPATIENT
Start: 2018-06-06 | End: 2018-06-06 | Stop reason: HOSPADM

## 2018-06-06 RX ORDER — SODIUM CHLORIDE, SODIUM LACTATE, POTASSIUM CHLORIDE, CALCIUM CHLORIDE 600; 310; 30; 20 MG/100ML; MG/100ML; MG/100ML; MG/100ML
INJECTION, SOLUTION INTRAVENOUS CONTINUOUS
Status: DISCONTINUED | OUTPATIENT
Start: 2018-06-06 | End: 2018-06-06 | Stop reason: HOSPADM

## 2018-06-06 RX ORDER — GABAPENTIN 300 MG/1
300 CAPSULE ORAL ONCE
Status: COMPLETED | OUTPATIENT
Start: 2018-06-06 | End: 2018-06-06

## 2018-06-06 RX ORDER — LIDOCAINE HYDROCHLORIDE 20 MG/ML
INJECTION, SOLUTION INFILTRATION; PERINEURAL PRN
Status: DISCONTINUED | OUTPATIENT
Start: 2018-06-06 | End: 2018-06-06

## 2018-06-06 RX ADMIN — PROPOFOL 200 MG: 10 INJECTION, EMULSION INTRAVENOUS at 15:39

## 2018-06-06 RX ADMIN — DEXAMETHASONE SODIUM PHOSPHATE 4 MG: 4 INJECTION, SOLUTION INTRA-ARTICULAR; INTRALESIONAL; INTRAMUSCULAR; INTRAVENOUS; SOFT TISSUE at 15:39

## 2018-06-06 RX ADMIN — ONDANSETRON 4 MG: 2 INJECTION INTRAMUSCULAR; INTRAVENOUS at 16:19

## 2018-06-06 RX ADMIN — PROPOFOL 200 MCG/KG/MIN: 10 INJECTION, EMULSION INTRAVENOUS at 15:41

## 2018-06-06 RX ADMIN — LIDOCAINE HYDROCHLORIDE 60 MG: 20 INJECTION, SOLUTION INFILTRATION; PERINEURAL at 15:39

## 2018-06-06 RX ADMIN — MORPHINE SULFATE 5 MG: 0.5 INJECTION, SOLUTION EPIDURAL; INTRATHECAL; INTRAVENOUS at 16:20

## 2018-06-06 RX ADMIN — KETOROLAC TROMETHAMINE 30 MG: 30 INJECTION, SOLUTION INTRAMUSCULAR; INTRAVENOUS at 16:19

## 2018-06-06 RX ADMIN — FENTANYL CITRATE 50 MCG: 50 INJECTION, SOLUTION INTRAMUSCULAR; INTRAVENOUS at 15:39

## 2018-06-06 RX ADMIN — SODIUM CHLORIDE, SODIUM LACTATE, POTASSIUM CHLORIDE, CALCIUM CHLORIDE: 600; 310; 30; 20 INJECTION, SOLUTION INTRAVENOUS at 15:28

## 2018-06-06 RX ADMIN — GABAPENTIN 300 MG: 300 CAPSULE ORAL at 14:13

## 2018-06-06 RX ADMIN — BUPIVACAINE HYDROCHLORIDE AND EPINEPHRINE 5 ML: 2.5; 5 INJECTION, SOLUTION INFILTRATION; PERINEURAL at 15:53

## 2018-06-06 RX ADMIN — FENTANYL CITRATE 25 MCG: 50 INJECTION, SOLUTION INTRAMUSCULAR; INTRAVENOUS at 15:58

## 2018-06-06 RX ADMIN — ACETAMINOPHEN 975 MG: 325 TABLET ORAL at 14:13

## 2018-06-06 NOTE — ANESTHESIA POSTPROCEDURE EVALUATION
Patient: Berna Nichols    Procedure(s):  Left Knee Examination Under Anesthesia, Left Knee Arthroscopy, Partial Medial Meniscectomy - Wound Class: I-Clean    Diagnosis:Left Knee Medial Mensical Tear  Diagnosis Additional Information: No value filed.    Anesthesia Type:  General, LMA    Note:  Anesthesia Post Evaluation    Patient location during evaluation: PACU  Patient participation: Able to fully participate in evaluation  Level of consciousness: awake and alert  Pain management: adequate  Airway patency: patent  Cardiovascular status: acceptable  Respiratory status: acceptable  Hydration status: acceptable  PONV: none     Anesthetic complications: None          Last vitals:  Vitals:    06/06/18 1400 06/06/18 1632 06/06/18 1640   BP: 96/70 98/59    Pulse:      Resp: 16 16 12   Temp:  36.4  C (97.5  F) 36.4  C (97.5  F)   SpO2:  98% 99%         Electronically Signed By: Vincenzo Cedeño MD  June 6, 2018  5:01 PM

## 2018-06-06 NOTE — OP NOTE
Procedure Date: 06/06/2018      PREOPERATIVE DIAGNOSIS:  Left knee medial meniscus tear.      POSTOPERATIVE DIAGNOSIS:  Left knee medial meniscus tear.      SURGEON:  Domenico Hartman MD      ASSISTANT:  Jeyson Perkins MD, Orthopedic Fellow        ANESTHETIC:  General.      DRAINS:  None.      SPONGE AND NEEDLE COUNT:  Correct.      MATERIAL FORWARDED TO THE LABORATORY:  None.      OPERATION PERFORMED:  Left knee arthroscopic partial medial meniscectomy.      INDICATIONS:  Berna Nichols is a 18-year-old [employer]  who underwent left ACL reconstruction on 12/21/2016 with BTB autograft.  She did remarkably well and returned to hockey.  She had an injury in early April while playing a variation of basketball.  She developed pain and mechanical symptoms.  An MRI was obtained, which revealed a displaced meniscus tear.  Jeanmarie tried to play some for [employer].  Her symptoms have persisted.  We elected to proceed with arthroscopic intervention.  I explained a knee arthroscopy to her.  We discussed the risks of surgery, including bleeding, infection, nerve damage, complications from anesthesia, blood clot, etc.  I also explained the more pertinent risks with this type of surgery, including failure to improve her pain.  We may make her pain worse.  She may lose range of motion or develop scar tissue that could be problematic.  She may go on to develop degenerative changes over time.  There is a possibility that she is unable to return to her desired level of athletic activity.  Jeanmarie had a chance to have her questions answered.  She understands the surgery as well as the surgical risks and agrees to proceed.      OPERATIVE FINDINGS:  Examination under anesthesia reveals a 1A Lachman.  Trace pivot glide.  Full range of motion.  The diagnostic arthroscopy reveals a normal-appearing suprapatellar pouch.  The patellofemoral joint is normal.  The lateral gutter is normal.  The medial gutter reveals a displaced fragment of  medial meniscus.  The lateral compartment reveals an intact lateral meniscus and normal articular cartilage.  The notch reveals an intact anterior cruciate ligament that is well synovialized.  The PCL is intact.  The medial compartment reveals a complex tear of the medial meniscus.  There is an anterior flap displaced anterior into the medial gutter and then a posterior flap displaced behind the femoral condyle.  The articular cartilage of the femoral condyle has minimal chondral fibrillation.  The tibial plateau is intact.      IMPLANTS:  None.      DESCRIPTION OF THE OPERATION:  After the patient was counseled and plans, alternatives and risks were discussed, consent was obtained.  The correct operative extremity was marked in the preoperative holding area.  Preoperative antibiotics were administered.  The patient was brought back to the operating suite and administered a general anesthetic.  Examination under anesthesia was performed, and the findings are noted above.  Left lower extremity was prepped and draped in the usual sterile fashion.  A time-out process was completed.  A standard anterolateral arthroscopy portal was created followed by an anteromedial portal for the working instruments.  A thorough diagnostic arthroscopy was undertaken, and the findings are noted above.  A meniscal biter was used to truncate and debride the anterior flap.  The meniscus fragment was removed with a grasper.  The posterior flap was debrided through the notch with a motorized resector.  The meniscus was gently contoured.  Approximately 40%-50% of the meniscus was resected.  A small amount of anteromedial scar tissue was resected.  The knee was copiously lavaged.  The arthroscopic instruments were removed.  Portal sites were closed with nylon suture.  Intraarticular morphine was instilled and a sterile dressing applied.  The patient was extubated on the operating room table and taken to the recovery room in good condition.   She tolerated the procedure well.  There were no complications.  Estimated blood loss was less than 5 mL.  A tourniquet was not used.      DISPOSITION:  The patient will be discharged home through Same Day Surgery per protocol.  Her weight bearing status is as tolerated.  Her range of motion is as tolerated.  She may remove her dressings on postoperative day #2 and shower, redressing her incisions with Band-Aids.  She was given Norco for postoperative pain and may have a refill in the first month postoperatively if necessary.  The patient will follow up on postoperative day #7 for a recheck and suture removal.         SATINDER MANN MD             D: 2018   T: 2018   MT: zhou      Name:     PHOEBE GRISSOM   MRN:      [id number]        Account:        [id number]   :      2000           Procedure Date: 2018      Document: [id number]

## 2018-06-06 NOTE — ANESTHESIA PREPROCEDURE EVALUATION
Anesthesia Evaluation     . Pt has had prior anesthetic.     No history of anesthetic complications          ROS/MED HX    ENT/Pulmonary:  - neg pulmonary ROS     Neurologic:  - neg neurologic ROS     Cardiovascular:  - neg cardiovascular ROS       METS/Exercise Tolerance:  >4 METS   Hematologic:  - neg hematologic  ROS       Musculoskeletal:   (+) , , other musculoskeletal- Shoulder instability. Meniscus tear      GI/Hepatic:  - neg GI/hepatic ROS       Renal/Genitourinary:  - ROS Renal section negative       Endo:  - neg endo ROS       Psychiatric:  - neg psychiatric ROS       Infectious Disease:  - neg infectious disease ROS       Malignancy:      - no malignancy   Other:    - neg other ROS                 Physical Exam  Normal systems: cardiovascular, pulmonary and dental    Airway   Mallampati: I  TM distance: >3 FB  Neck ROM: full    Dental     Cardiovascular       Pulmonary                     Anesthesia Plan      History & Physical Review  History and physical reviewed and following examination; no interval change.    ASA Status:  1 .    NPO Status:  > 6 hours    Plan for General and LMA with Intravenous and Propofol induction. Maintenance will be TIVA.    PONV prophylaxis:  Ondansetron (or other 5HT-3) and Dexamethasone or Solumedrol       Postoperative Care  Postoperative pain management:  IV analgesics, Oral pain medications and Multi-modal analgesia.      Consents  Anesthetic plan, risks, benefits and alternatives discussed with:  Patient..                  History and physical assessed; Patient examined.   Risks and alternatives presented and discussed. Patient and family agree. All questions answered.      Vincenzo Cedeño MD  Staff Anesthesiologist  *[phone]

## 2018-06-06 NOTE — ANESTHESIA CARE TRANSFER NOTE
Patient: Berna Nichols    Procedure(s):  Left Knee Examination Under Anesthesia, Left Knee Arthroscopy, Partial Medial Meniscectomy - Wound Class: I-Clean    Diagnosis: Left Knee Medial Mensical Tear  Diagnosis Additional Information: No value filed.    Anesthesia Type:   General, LMA     Note:  Airway :Room Air  Patient transferred to:PACU  Comments: VSS/WNL. Responds well.Handoff Report: Identifed the Patient, Identified the Reponsible Provider, Reviewed the pertinent medical history, Discussed the surgical course, Reviewed Intra-OP anesthesia mangement and issues during anesthesia, Set expectations for post-procedure period and Allowed opportunity for questions and acknowledgement of understanding      Vitals: (Last set prior to Anesthesia Care Transfer)    [facility] VITALS  6/6/2018 1556 - 6/6/2018 1630      6/6/2018             Pulse: 69    SpO2: 96 %    Resp Rate (observed): (!)  2                Electronically Signed By: DREW Whitten CRNA  June 6, 2018  4:30 PM

## 2018-06-06 NOTE — DISCHARGE INSTRUCTIONS
"Safety Tips for Using Crutches    Crutch Fit:    Assume good standing posture with shoulders relaxed and crutch tips 6-8 inches out from the side of the foot.    The underarm pad should fall 2-3 fingers width below the armpit.    The handgrip is positioned level with the wrist to allow 30  flexion at the elbow.    Safety Tips:    Bear weight on your hands, not on your armpits.    Do not add extra padding to the underarm pad. This will, in effect, lengthen the crutches and increase risk of nerve injury.    Wear flat, properly fitting shoes. Do not walk in stocking feet, high heels or slippers.    Household hazards:  --Throw rugs should be removed from floors.  --Stairs should be cleared of obstacles.  --Use extra caution on slippery, highly polished, littered or uneven floor surfaces.  --Check for electric cords.    Check crutch tips for excessive wear and keep wing nuts tight.    While walking, look forward with  head up  and  eyes open.  Take equal length steps.    Use BOTH crutches.    Stairs Sequence:    UP: \"Good\" leg first, followed by  bad  leg, then crutches.    DOWN: Crutches, followed by  bad  leg, \"good\" leg.     Walking with Crutches:    Move both crutches forward at the same time.    Non-Weight Bearing (NWB):  Hold the involved leg up and swing through the crutches with the involved leg. The involved leg does not touch the floor.    Toe Touch Weight Bearing (TTWB): Move the involved leg forward. Rest it lightly on the floor for balance only. Step through the crutches with the uninvolved leg.    Partial Weight Bearing (PWB): Move the involved leg forward. Step down the weight of the leg only.  Step through the crutches with the uninvolved leg.    Weight Bearing As Tolerated (WBAT): Move the involved leg forward. Put as much pressure through the involved leg as you can tolerate comfortably. Then step through the crutches with the uninvolved leg.      [facility]  Home " "Care Following Anesthesia  For 24 hours after surgery:  1. Get plenty of rest.  A responsible adult must stay with you for at least 24 hours after you leave the surgery center.  2. Do not drive or use heavy equipment.  If you have weakness or tingling, don't drive or use heavy equipment until this feeling goes away.   3. Do not drink alcohol.   4. Avoid strenuous or risky activities.  Ask for help when climbing stairs.  5. You may feel lightheaded.  IF so, sit for a few minutes before standing.  Have someone help you get up.   6. If you have nausea (feel sick to your stomach): Drink only clear liquids such as apple juice, ginger ale, broth or 7-Up.  Rest may also help.  Be sure to drink enough fluids.  Move to a regular diet as you feel able.   7. You may have a slight fever.  Call the doctor if your fever is over 100 F (37.7 C) (taken under the tongue) or lasts longer than 24 hours.  8. You may have a dry mouth, a sore throat, muscle aches or trouble sleeping. These should go away after 24 hours.  9. Do not make important or legal decisions.        Today you received a Marcaine or bupivacaine block to numb the nerves near your surgery site.  This is a block using local anesthetic or \"numbing\" medication injected around the nerves to anesthetize or \"numb\" the area supplied by those nerves.  This block is injected into the muscle layer near your surgical site.  The medication may numb the location where you had surgery for 6-18 hours, but may last up to 24 hours.  If your surgical site is an arm or leg you should be careful with your affected limb, since it is possible to injure your limb without being aware of it due to the numbing.  Until full feeling returns, you should guard against bumping or hitting your limb, and avoid extreme hot or cold temperatures on the skin.  As the block wears off, the feeling will return as a tingling or prickly sensation near your surgical site.  You will experience more discomfort " from your incision as the feeling returns.  You may want to take a pain pill (a narcotic or Tylenol if this was prescribed by your surgeon) when you start to experience mild pain before the pain beccomes more severe.  If your pain medications do not control your pain you should notifiy your surgeon.    Tips for taking pain medications  To get the best pain relief possible, remember these points:    Take pain medications as directed, before pain becomes severe.    Pain medication can upset your stomach: taking it with food may help.    Constipation is a common side effect of pain medication. Drink plenty of  fluids.    Eat foods high in fiber. Take a stool softener if recommended by your doctor or pharmacist.    Do not drink alcohol, drive or operate machinery while taking pain medications.    Ask about other ways to control pain, such as with heat, ice or relaxation.    Tylenol/Acetaminophen Consumption  To help encourage the safe use of acetaminophen, the makers of TYLENOL  have lowered the maximum daily dose for single-ingredient Extra Strength TYLENOL  (acetaminophen) products sold in the [place] from 8 pills per day (4,000 mg) to 6 pills per day (3,000 mg). The dosing interval has also changed from 2 pills every 4-6 hours to 2 pills every 6 hours.    If you feel your pain relief is insufficient, you may take Tylenol/Acetaminophen in addition to your narcotic pain medication.     Be careful not to exceed 3,000 mg of Tylenol/Acetaminophen in a 24 hour period from all sources.    If you are taking extra strength Tylenol/acetaminophen (500 mg), the maximum dose is 6 tablets in 24 hours.    If you are taking regular strength acetaminophen (325 mg), the maximum dose is 9 tablets in 24 hours.    Call a doctor for any of the followin. Signs of infection (fever, growing tenderness at the surgery site, a large amount of drainage or bleeding, severe pain, foul-smelling drainage, redness, swelling).  2. It has been over 8  to 10 hours since surgery and you are still not able to urinate (pass water).  3. Headache for over 24 hours.  Your doctor is:       Dr. Domenico Hartman, Orthopaedics: [phone]               Or dial [phone] and ask for the resident on call for:  Orthopaedics  For emergency care, call the:  [facility] Emergency Department: [phone] (TTY for hearing impaired: [phone])

## 2018-06-06 NOTE — IP AVS SNAPSHOT
MRN:[id number]                      After Visit Summary   6/6/2018    Berna Nichols    MRN: [id number]           Thank you!     Thank you for choosing [facility] for your care. Our goal is always to provide you with excellent care. Hearing back from our patients is one way we can continue to improve our services. Please take a few minutes to complete the written survey that you may receive in the mail after you visit with us. Thank you!        Patient Information     Date Of Birth          2000        About your hospital stay     You were admitted on:  June 6, 2018 [facility]    You were discharged on:  June 6, 2018       Who to Call     For medical emergencies, please call 911.  For non-urgent questions about your medical care, please call your primary care provider or clinic, [phone]  For questions related to your surgery, please call your surgery clinic        Attending Provider     Provider Specialty    Domenico Hartman MD Orthopedics       Primary Care Provider Office Phone # Fax #    Karina Lieberman [phone]      After Care Instructions      Diet as Tolerated       Return to diet before surgery, unless instructed otherwise.             Diet as Tolerated       Return to diet before surgery, unless instructed otherwise.            Discharge Instructions       Review outpatient procedure discharge instructions with patient as directed by Provider            Discharge Instructions       Review outpatient procedure discharge instructions with patient as directed by Provider            Dressing Change       Change dressing on second day after surgery.            Follow-up Physical Therapy appointment       Start physical therapy within the next week. Follow a knee arthroscopy rehab protocol.            Ice to affected area       Ice pack to surgical site every 15 minutes per hour for 24 hours            Ice to  affected area       Ice pack to surgical site every 15 minutes per hour for 24 hours            No Alcohol       For 24 hours post procedure            No driving or operating machinery        until the day after procedure            Notify Provider       For signs and symptoms of infection: Fever greater than 101, redness, swelling, heat at site, drainage, pus.            Remove dressing - at 48 hours           Return to clinic       Return to clinic in 2 weeks.  Call to confirm date and time.            Return to clinic in 7-10 days.       Return to clinic in 7-10 days for suture removal. You can see Dr. Hartman or Juli Son based on the schedule.            Shower       Remove dressing and shower 2 days after surgery.            Shower        POD2, Cover dressing if dressing is not going to be changed today            Weight bearing - As tolerated           Weight bearing - As tolerated           Wound care       Do not immerse wound in water until sutures removed            Wound care       Redress incisions with band-aids and tubi-. Incisions may get wet in the shower 2 days after surgery.  Do not get incisions wet in a tub, pool, or lake for 4 weeks.                  Further instructions from your care team       Safety Tips for Using Crutches    Crutch Fit:    Assume good standing posture with shoulders relaxed and crutch tips 6-8 inches out from the side of the foot.    The underarm pad should fall 2-3 fingers width below the armpit.    The handgrip is positioned level with the wrist to allow 30  flexion at the elbow.    Safety Tips:    Bear weight on your hands, not on your armpits.    Do not add extra padding to the underarm pad. This will, in effect, lengthen the crutches and increase risk of nerve injury.    Wear flat, properly fitting shoes. Do not walk in stocking feet, high heels or slippers.    Household hazards:  --Throw rugs should be removed from floors.  --Stairs should be cleared of  "obstacles.  --Use extra caution on slippery, highly polished, littered or uneven floor surfaces.  --Check for electric cords.    Check crutch tips for excessive wear and keep wing nuts tight.    While walking, look forward with  head up  and  eyes open.  Take equal length steps.    Use BOTH crutches.    Stairs Sequence:    UP: \"Good\" leg first, followed by  bad  leg, then crutches.    DOWN: Crutches, followed by  bad  leg, \"good\" leg.     Walking with Crutches:    Move both crutches forward at the same time.    Non-Weight Bearing (NWB):  Hold the involved leg up and swing through the crutches with the involved leg. The involved leg does not touch the floor.    Toe Touch Weight Bearing (TTWB): Move the involved leg forward. Rest it lightly on the floor for balance only. Step through the crutches with the uninvolved leg.    Partial Weight Bearing (PWB): Move the involved leg forward. Step down the weight of the leg only.  Step through the crutches with the uninvolved leg.    Weight Bearing As Tolerated (WBAT): Move the involved leg forward. Put as much pressure through the involved leg as you can tolerate comfortably. Then step through the crutches with the uninvolved leg.      [facility]  Home Care Following Anesthesia  For 24 hours after surgery:  1. Get plenty of rest.  A responsible adult must stay with you for at least 24 hours after you leave the surgery center.  2. Do not drive or use heavy equipment.  If you have weakness or tingling, don't drive or use heavy equipment until this feeling goes away.   3. Do not drink alcohol.   4. Avoid strenuous or risky activities.  Ask for help when climbing stairs.  5. You may feel lightheaded.  IF so, sit for a few minutes before standing.  Have someone help you get up.   6. If you have nausea (feel sick to your stomach): Drink only clear liquids such as apple juice, ginger ale, broth or 7-Up.  Rest may also help.  Be sure to drink " "enough fluids.  Move to a regular diet as you feel able.   7. You may have a slight fever.  Call the doctor if your fever is over 100 F (37.7 C) (taken under the tongue) or lasts longer than 24 hours.  8. You may have a dry mouth, a sore throat, muscle aches or trouble sleeping. These should go away after 24 hours.  9. Do not make important or legal decisions.        Today you received a Marcaine or bupivacaine block to numb the nerves near your surgery site.  This is a block using local anesthetic or \"numbing\" medication injected around the nerves to anesthetize or \"numb\" the area supplied by those nerves.  This block is injected into the muscle layer near your surgical site.  The medication may numb the location where you had surgery for 6-18 hours, but may last up to 24 hours.  If your surgical site is an arm or leg you should be careful with your affected limb, since it is possible to injure your limb without being aware of it due to the numbing.  Until full feeling returns, you should guard against bumping or hitting your limb, and avoid extreme hot or cold temperatures on the skin.  As the block wears off, the feeling will return as a tingling or prickly sensation near your surgical site.  You will experience more discomfort from your incision as the feeling returns.  You may want to take a pain pill (a narcotic or Tylenol if this was prescribed by your surgeon) when you start to experience mild pain before the pain beccomes more severe.  If your pain medications do not control your pain you should notifiy your surgeon.    Tips for taking pain medications  To get the best pain relief possible, remember these points:    Take pain medications as directed, before pain becomes severe.    Pain medication can upset your stomach: taking it with food may help.    Constipation is a common side effect of pain medication. Drink plenty of  fluids.    Eat foods high in fiber. Take a stool softener if recommended by your " doctor or pharmacist.    Do not drink alcohol, drive or operate machinery while taking pain medications.    Ask about other ways to control pain, such as with heat, ice or relaxation.    Tylenol/Acetaminophen Consumption  To help encourage the safe use of acetaminophen, the makers of TYLENOL  have lowered the maximum daily dose for single-ingredient Extra Strength TYLENOL  (acetaminophen) products sold in the [place] from 8 pills per day (4,000 mg) to 6 pills per day (3,000 mg). The dosing interval has also changed from 2 pills every 4-6 hours to 2 pills every 6 hours.    If you feel your pain relief is insufficient, you may take Tylenol/Acetaminophen in addition to your narcotic pain medication.     Be careful not to exceed 3,000 mg of Tylenol/Acetaminophen in a 24 hour period from all sources.    If you are taking extra strength Tylenol/acetaminophen (500 mg), the maximum dose is 6 tablets in 24 hours.    If you are taking regular strength acetaminophen (325 mg), the maximum dose is 9 tablets in 24 hours.    Call a doctor for any of the followin. Signs of infection (fever, growing tenderness at the surgery site, a large amount of drainage or bleeding, severe pain, foul-smelling drainage, redness, swelling).  2. It has been over 8 to 10 hours since surgery and you are still not able to urinate (pass water).  3. Headache for over 24 hours.  Your doctor is:       Dr. Domenico Hartman, Orthopaedics: [phone]               Or dial [phone] and ask for the resident on call for:  Orthopaedics  For emergency care, call the:  [facility] Emergency Department: [phone] (TTY for hearing impaired: [phone])                Pending Results     No orders found from 2018 to 2018.            Admission Information     Date & Time Provider Department Dept. Phone    2018 Domenico Hartman MD [facility] [phone]      Your Vitals Were     Blood Pressure Pulse Temperature  "Respirations Height Weight    98/59 51 97.5  F (36.4  C) (Temporal) 12 1.727 m (5' 8\") 68 kg (150 lb)    Last Period Pulse Oximetry BMI (Body Mass Index)             05/18/2018 99% 22.81 kg/m2         [employer] Information     [employer] gives you secure access to your electronic health record. If you see a primary care provider, you can also send messages to your care team and make appointments. If you have questions, please call your primary care clinic.  If you do not have a primary care provider, please call [phone] and they will assist you.      [employer] is an electronic gateway that provides easy, online access to your medical records. With [employer], you can request a clinic appointment, read your test results, renew a prescription or communicate with your care team.     To access your existing account, please contact your [facility] or call [phone] for assistance.        Care EveryWhere ID     This is your Care EveryWhere ID. This could be used by other organizations to access your [facility] medical records  [id number]        Equal Access to Services     ANTHONY TENORIO : Hadii eleanor Cat, wajessica hui, qakelechi phipps, anup priest. So [facility] [phone].    ATENCIÓN: Si habla español, tiene a davila disposición servicios gratuitos de asistencia lingüística. Ely al [phone].    We comply with applicable federal civil rights laws and [place] laws. We do not discriminate on the basis of race, color, national origin, age, disability, sex, sexual orientation, or gender identity.               Review of your medicines      START taking        Dose / Directions    * HYDROcodone-acetaminophen 5-325 MG per tablet   Commonly known as:  NORCO   Used for:  Knee pain, unspecified chronicity, unspecified laterality        Dose:  1-2 tablet   Take 1-2 tablets by mouth every 4 hours as needed for other (Moderate to Severe Pain) "   Quantity:  20 tablet   Refills:  0       * HYDROcodone-acetaminophen 5-325 MG per tablet   Commonly known as:  NORCO   Used for:  S/P arthroscopic knee surgery        Dose:  1-2 tablet   Take 1-2 tablets by mouth every 4 hours as needed for other (Moderate to Severe Pain)   Quantity:  30 tablet   Refills:  0       senna-docusate 8.6-50 MG per tablet   Commonly known as:  SENOKOT-S;PERICOLACE   Used for:  Knee pain, unspecified chronicity, unspecified laterality        Dose:  1-2 tablet   Take 1-2 tablets by mouth 2 times daily Take while on oral narcotics to prevent or treat constipation.   Quantity:  30 tablet   Refills:  0       * Notice:  This list has 2 medication(s) that are the same as other medications prescribed for you. Read the directions carefully, and ask your doctor or other care provider to review them with you.      CONTINUE these medicines which have NOT CHANGED        Dose / Directions    norethindrone-ethinyl estradiol-iron 1-20/1-30/1-35 MG-MCG per tablet   Commonly known as:  ESTROSTEP FE        Dose:  1 tablet   Take 1 tablet by mouth daily   Refills:  0            Where to get your medicines      Some of these will need a paper prescription and others can be bought over the counter. Ask your nurse if you have questions.     Bring a paper prescription for each of these medications     HYDROcodone-acetaminophen 5-325 MG per tablet    HYDROcodone-acetaminophen 5-325 MG per tablet    senna-docusate 8.6-50 MG per tablet                Protect others around you: Learn how to safely use, store and throw away your medicines at www.disposemymeds.org.        Information about OPIOIDS     PRESCRIPTION OPIOIDS: WHAT YOU NEED TO KNOW   You have a prescription for an opioid (narcotic) pain medicine. Opioids can cause addiction. If you have a history of chemical dependency of any type, you are at a higher risk of becoming addicted to opioids. Only take this medicine after all other options have been tried.  Take it for as short a time and as few doses as possible.     Do not:    Drive. If you drive while taking these medicines, you could be arrested for driving under the influence (DUI).    Operate heavy machinery    Do any other dangerous activities while taking these medicines.     Drink any alcohol while taking these medicines.      Take with any other medicines that contain acetaminophen. Read all labels carefully. Look for the word  acetaminophen  or  Tylenol.  Ask your pharmacist if you have questions or are unsure.    Store your pills in a secure place, locked if possible. We will not replace any lost or stolen medicine. If you don t finish your medicine, please throw away (dispose) as directed by your pharmacist. The [place] Pollution Control Agency has more information about safe disposal: https://www.pca.[facility].[place].us/living-green/managing-unwanted-medications    All opioids tend to cause constipation. Drink plenty of water and eat foods that have a lot of fiber, such as fruits, vegetables, prune juice, apple juice and high-fiber cereal. Take a laxative (Miralax, milk of magnesia, Colace, Senna) if you don t move your bowels at least every other day.              Medication List: This is a list of all your medications and when to take them. Check marks below indicate your daily home schedule. Keep this list as a reference.      Medications           Morning Afternoon Evening Bedtime As Needed    * HYDROcodone-acetaminophen 5-325 MG per tablet   Commonly known as:  NORCO   Take 1-2 tablets by mouth every 4 hours as needed for other (Moderate to Severe Pain)                                * HYDROcodone-acetaminophen 5-325 MG per tablet   Commonly known as:  NORCO   Take 1-2 tablets by mouth every 4 hours as needed for other (Moderate to Severe Pain)                                norethindrone-ethinyl estradiol-iron 1-20/1-30/1-35 MG-MCG per tablet   Commonly known as:  ESTROSTEP FE   Take 1 tablet by mouth  daily                                senna-docusate 8.6-50 MG per tablet   Commonly known as:  SENOKOT-S;PERICOLACE   Take 1-2 tablets by mouth 2 times daily Take while on oral narcotics to prevent or treat constipation.                                * Notice:  This list has 2 medication(s) that are the same as other medications prescribed for you. Read the directions carefully, and ask your doctor or other care provider to review them with you.

## 2018-06-06 NOTE — IP AVS SNAPSHOT
[facility]    [address] [place] [id number]    Phone:  [phone]    Fax:  [phone]                                       After Visit Summary   6/6/2018    Berna Nichols    MRN: [id number]           After Visit Summary Signature Page     I have received my discharge instructions, and my questions have been answered. I have discussed any challenges I see with this plan with the nurse or doctor.    ..........................................................................................................................................  Patient/Patient Representative Signature      ..........................................................................................................................................  Patient Representative Print Name and Relationship to Patient    ..................................................               ................................................  Date                                            Time    ..........................................................................................................................................  Reviewed by Signature/Title    ...................................................              ..............................................  Date                                                            Time

## 2018-06-06 NOTE — BRIEF OP NOTE
[facility]    Orthopaedic Surgery  Brief Operative Note    Pre-operative diagnosis: Left Knee Medial Mensical Tear   Post-operative diagnosis Same   Procedure: Procedure(s):  Left Knee Examination Under Anesthesia, Left Knee Arthroscopy, Partial Medial Meniscectomy - Wound Class: I-Clean   Surgeon: Domenico Hartman MD   Assistants(s): Jeyson Perkins MD   Anesthesia: General    Estimated blood loss: Minimal   Total IV fluids: (See anesthesia record)   Blood transfusion: (See anesthesia record)   Total urine output: (See anesthesia record)   Drains: None   Specimens: * No specimens in log *   Findings: None   Complications: None   Implants: None

## 2018-06-13 ENCOUNTER — OFFICE VISIT (OUTPATIENT)
Dept: ORTHOPEDICS | Facility: CLINIC | Age: 18
End: 2018-06-13
Payer: COMMERCIAL

## 2018-06-13 ENCOUNTER — CARE COORDINATION (OUTPATIENT)
Dept: SURGERY | Facility: CLINIC | Age: 18
End: 2018-06-13

## 2018-06-13 DIAGNOSIS — Z98.890 S/P ARTHROSCOPY OF KNEE: Primary | ICD-10-CM

## 2018-06-13 NOTE — PROGRESS NOTES
Patient was seen in clinic by Dr. Rucker 3/30. It was recommended she have a repeat US of her axilla done in 3 months. Called patient to remind her of this and to help schedule if needed. Dr. Rucker would like to review US results once available to determine if patient needs to be seen in clinic again. Left a message for patient asking her to call back when convenient. GS number provided.

## 2018-06-13 NOTE — LETTER
2018      RE: Phoebe Grissom  [address] [place] [id number]       Service Date: 2018      CHIEF COMPLAINT:  Postoperative visit, left knee.      DATE OF SURGERY:  2018.      HISTORY OF PRESENT ILLNESS:  Jeanmarie is a 18-year-old female 1 week status post left knee arthroscopic partial medial meniscectomy.  She is doing well.  No pain.  No fever, chills or night sweats.      PHYSICAL EXAMINATION:  Left knee reveals trace effusion.  Range of motion 4 degrees of hyperextension to 140 degrees of flexion but this is symmetrical.  No Lachman.  Excellent quadriceps bulk.  Incision is healing without erythema, induration or drainage.      IMPRESSION:  One week status post left knee arthroscopy and partial medial meniscectomy.  Doing well.      PLAN:   1.  Sutures will be removed today.   2.  Continue strengthening and low-impact conditioning.   3.  The patient may skate at 4 weeks postop.   4.  Follow up with me in 6 weeks for a routine recheck.         SATINDER MANN MD             D: 2018   T: 06/15/2018   MT: SONIA      Name:     PHOEBE GRISSOM   MRN:      [id number]        Account:      [id number]   :      2000           Service Date: 2018      Document: [id number]

## 2018-06-13 NOTE — MR AVS SNAPSHOT
After Visit Summary   6/13/2018    Berna Nichols    MRN: [id number]           Patient Information     Date Of Birth          2000        Visit Information        Provider Department      6/13/2018 6:15 PM Domenico Hartman MD [facility]        Today's Diagnoses     S/P arthroscopy of knee    -  1       Follow-ups after your visit        Who to contact     Please call your clinic at [phone] to:    Ask questions about your health    Make or cancel appointments    Discuss your medicines    Learn about your test results    Speak to your doctor            Additional Information About Your Visit        MyChart Information     [employer] gives you secure access to your electronic health record. If you see a primary care provider, you can also send messages to your care team and make appointments. If you have questions, please call your primary care clinic.  If you do not have a primary care provider, please call [phone] and they will assist you.      [employer] is an electronic gateway that provides easy, online access to your medical records. With [employer], you can request a clinic appointment, read your test results, renew a prescription or communicate with your care team.     To access your existing account, please contact your [facility] or call [phone] for assistance.        Care EveryWhere ID     This is your Care EveryWhere ID. This could be used by other organizations to access your [place] medical records  [id number]        Your Vitals Were     Last Period                   05/18/2018            Blood Pressure from Last 3 Encounters:   06/06/18 109/75   05/25/18 122/71   04/10/18 115/82    Weight from Last 3 Encounters:   06/06/18 150 lb (68 kg)   05/25/18 149 lb (67.6 kg)   04/10/18 150 lb (68 kg)              Today, you had the following     No orders found for display       Primary Care Provider Office Phone # Fax #     Karina Lieberman [phone]       [address] [place] [address]        Equal Access to Services     ANTHONY TENORIO : Hadii aad ku hadramirorickie Sofranki, wasandritada luqadaha, qaybta kaalmada moise, anup telmain hayaajonna foremanyenny hart laJunehien priest. So [facility] [phone].    ATENCIÓN: Si habla español, tiene a davila disposición servicios gratuitos de asistencia lingüística. Llame al [phone].    We comply with applicable federal civil rights laws and [place] laws. We do not discriminate on the basis of race, color, national origin, age, disability, sex, sexual orientation, or gender identity.            Thank you!     Thank you for choosing [facility]  for your care. Our goal is always to provide you with excellent care. Hearing back from our patients is one way we can continue to improve our services. Please take a few minutes to complete the written survey that you may receive in the mail after your visit with us. Thank you!             Your Updated Medication List - Protect others around you: Learn how to safely use, store and throw away your medicines at www.disposemymeds.org.          This list is accurate as of 6/13/18 11:59 PM.  Always use your most recent med list.                   Brand Name Dispense Instructions for use Diagnosis    norethindrone-ethinyl estradiol-iron 1-20/1-30/1-35 MG-MCG per tablet    ESTROSTEP FE     Take 1 tablet by mouth daily

## 2018-06-15 NOTE — PROGRESS NOTES
Service Date: 2018      CHIEF COMPLAINT:  Postoperative visit, left knee.      DATE OF SURGERY:  2018.      HISTORY OF PRESENT ILLNESS:  Jeanmarie is a 18-year-old female 1 week status post left knee arthroscopic partial medial meniscectomy.  She is doing well.  No pain.  No fever, chills or night sweats.      PHYSICAL EXAMINATION:  Left knee reveals trace effusion.  Range of motion 4 degrees of hyperextension to 140 degrees of flexion but this is symmetrical.  No Lachman.  Excellent quadriceps bulk.  Incision is healing without erythema, induration or drainage.      IMPRESSION:  One week status post left knee arthroscopy and partial medial meniscectomy.  Doing well.      PLAN:   1.  Sutures will be removed today.   2.  Continue strengthening and low-impact conditioning.   3.  The patient may skate at 4 weeks postop.   4.  Follow up with me in 6 weeks for a routine recheck.         SATINDER MANN MD             D: 2018   T: 06/15/2018   MT: SONIA      Name:     PHOEBE GRISSOM   MRN:      [id number]        Account:      [id number]   :      2000           Service Date: 2018      Document: [id number]

## 2018-10-03 ENCOUNTER — OFFICE VISIT (OUTPATIENT)
Dept: ORTHOPEDICS | Facility: CLINIC | Age: 18
End: 2018-10-03
Payer: COMMERCIAL

## 2018-10-03 DIAGNOSIS — Z98.890 S/P LEFT KNEE ARTHROSCOPY: Primary | ICD-10-CM

## 2018-10-03 NOTE — LETTER
RE: Phoebe Grissom  [address] [address]     Service Date: 10/03/2018      CHIEF COMPLAINT:  Postoperative followup, left knee.      DATE OF SURGERY:  2018.      HISTORY OF PRESENT ILLNESS:  Jeanmarie is a 18-year-old [place]  who is now 4 months status post left knee arthroscopic partial medial meniscectomy.  She is doing extremely well.  She has no pain.  She has no swelling.  She ranks her knee essentially 100.  She has no instability.      PHYSICAL EXAMINATION:  Left knee reveals no effusion.  Full and symmetrical range of motion.  No Lachman.  No pivot shift.  No joint line tenderness.      IMPRESSION:  Four months status post left knee arthroscopy and partial medial meniscectomy.  Jeanmarie had a previous ACL reconstruction.  Her knee is doing well.  She is stable.  She is back to full hockey participation.      PLAN:   1.  The patient will continue athletics as tolerated.   2.  She will follow up with me at the end of the season for a routine recheck.      I spent 15 minutes with this patient, with 10 minutes dedicated to counseling, education and development of a treatment plan.         SATINDER MANN MD        D: 10/14/2018   T: 10/15/2018   MT: ISATU    Name:     PHOEBE GRISSOM   MRN:      [id number]        Account:      [id number]   :      2000           Service Date: 10/03/2018    Document: [id number]

## 2018-10-03 NOTE — MR AVS SNAPSHOT
After Visit Summary   10/3/2018    Berna Nichols    MRN: [id number]           Patient Information     Date Of Birth          2000        Visit Information        Provider Department      10/3/2018 5:45 PM Domenico Hartman MD [facility]        Today's Diagnoses     S/P left knee arthroscopy    -  1       Follow-ups after your visit        Who to contact     Please call your clinic at [phone] to:    Ask questions about your health    Make or cancel appointments    Discuss your medicines    Learn about your test results    Speak to your doctor            Additional Information About Your Visit        MyChart Information     [employer] gives you secure access to your electronic health record. If you see a primary care provider, you can also send messages to your care team and make appointments. If you have questions, please call your primary care clinic.  If you do not have a primary care provider, please call [phone] and they will assist you.      [employer] is an electronic gateway that provides easy, online access to your medical records. With [employer], you can request a clinic appointment, read your test results, renew a prescription or communicate with your care team.     To access your existing account, please contact your [facility] or call [phone] for assistance.        Care EveryWhere ID     This is your Care EveryWhere ID. This could be used by other organizations to access your [place] medical records  [id number]         Blood Pressure from Last 3 Encounters:   06/06/18 109/75   05/25/18 122/71   04/10/18 115/82    Weight from Last 3 Encounters:   06/06/18 150 lb (68 kg)   05/25/18 149 lb (67.6 kg)   04/10/18 150 lb (68 kg)              Today, you had the following     No orders found for display       Primary Care Provider Office Phone # Fax #    Karina Lieberman [phone]       [address] [place]  [address]        Equal Access to Services     [facility] : Hadii aad ku hadramirorickie Shalondafranki, brookjessica hannajesseha, federicaliseth kendrickcarmenanup machado. So [facility] [phone].    ATENCIÓN: Si habla español, tiene a davila disposición servicios gratuitos de asistencia lingüística. Llame al [phone].    We comply with applicable federal civil rights laws and [place] laws. We do not discriminate on the basis of race, color, national origin, age, disability, sex, sexual orientation, or gender identity.            Thank you!     Thank you for choosing [facility]  for your care. Our goal is always to provide you with excellent care. Hearing back from our patients is one way we can continue to improve our services. Please take a few minutes to complete the written survey that you may receive in the mail after your visit with us. Thank you!             Your Updated Medication List - Protect others around you: Learn how to safely use, store and throw away your medicines at www.disposemymeds.org.          This list is accurate as of 10/3/18 11:59 PM.  Always use your most recent med list.                   Brand Name Dispense Instructions for use Diagnosis    norethindrone-ethinyl estradiol-iron 1-20/1-30/1-35 MG-MCG per tablet    ESTROSTEP FE     Take 1 tablet by mouth daily

## 2018-10-15 NOTE — PROGRESS NOTES
Service Date: 10/03/2018      CHIEF COMPLAINT:  Postoperative followup, left knee.      DATE OF SURGERY:  2018.      HISTORY OF PRESENT ILLNESS:  Jeanmarie is a 18-year-old [place]  who is now 4 months status post left knee arthroscopic partial medial meniscectomy.  She is doing extremely well.  She has no pain.  She has no swelling.  She ranks her knee essentially 100.  She has no instability.      PHYSICAL EXAMINATION:  Left knee reveals no effusion.  Full and symmetrical range of motion.  No Lachman.  No pivot shift.  No joint line tenderness.      IMPRESSION:  Four months status post left knee arthroscopy and partial medial meniscectomy.  Jeanmarie had a previous ACL reconstruction.  Her knee is doing well.  She is stable.  She is back to full hockey participation.      PLAN:   1.  The patient will continue athletics as tolerated.   2.  She will follow up with me at the end of the season for a routine recheck.      I spent 15 minutes with this patient, with 10 minutes dedicated to counseling, education and development of a treatment plan.         SATINDER MANN MD             D: 10/14/2018   T: 10/15/2018   MT: ISATU      Name:     PHOEBE GRISSOM   MRN:      [id number]        Account:      [id number]   :      2000           Service Date: 10/03/2018      Document: [id number]

## 2018-11-06 DIAGNOSIS — R59.9 LYMPH NODE ENLARGEMENT: ICD-10-CM

## 2018-11-06 DIAGNOSIS — R59.9 LYMPH NODE ENLARGEMENT: Primary | ICD-10-CM

## 2018-11-06 LAB
BASOPHILS # BLD AUTO: 0.1 10E9/L (ref 0–0.2)
BASOPHILS NFR BLD AUTO: 0.7 %
CRP SERPL-MCNC: 5.1 MG/L (ref 0–8)
DIFFERENTIAL METHOD BLD: NORMAL
EOSINOPHIL # BLD AUTO: 0.2 10E9/L (ref 0–0.7)
EOSINOPHIL NFR BLD AUTO: 2.9 %
ERYTHROCYTE [DISTWIDTH] IN BLOOD BY AUTOMATED COUNT: 12.3 % (ref 10–15)
ERYTHROCYTE [SEDIMENTATION RATE] IN BLOOD BY WESTERGREN METHOD: 9 MM/H (ref 0–20)
HCT VFR BLD AUTO: 42 % (ref 35–47)
HGB BLD-MCNC: 13.5 G/DL (ref 11.7–15.7)
IMM GRANULOCYTES # BLD: 0 10E9/L (ref 0–0.4)
IMM GRANULOCYTES NFR BLD: 0.1 %
LYMPHOCYTES # BLD AUTO: 1.6 10E9/L (ref 0.8–5.3)
LYMPHOCYTES NFR BLD AUTO: 22.8 %
MCH RBC QN AUTO: 30.2 PG (ref 26.5–33)
MCHC RBC AUTO-ENTMCNC: 32.1 G/DL (ref 31.5–36.5)
MCV RBC AUTO: 94 FL (ref 78–100)
MONOCYTES # BLD AUTO: 0.6 10E9/L (ref 0–1.3)
MONOCYTES NFR BLD AUTO: 7.8 %
NEUTROPHILS # BLD AUTO: 4.7 10E9/L (ref 1.6–8.3)
NEUTROPHILS NFR BLD AUTO: 65.7 %
NRBC # BLD AUTO: 0 10*3/UL
NRBC BLD AUTO-RTO: 0 /100
PLATELET # BLD AUTO: 234 10E9/L (ref 150–450)
RBC # BLD AUTO: 4.47 10E12/L (ref 3.8–5.2)
WBC # BLD AUTO: 7.2 10E9/L (ref 4–11)

## 2018-11-07 LAB
EBV NA IGG SER QL IA: >8 AI (ref 0–0.8)
EBV VCA IGM SER QL IA: 0.2 AI (ref 0–0.8)
MUV IGG SER QL IA: 0.4 AI (ref 0–0.8)

## 2018-11-07 NOTE — PROGRESS NOTES
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 No injury and no associated viral or bacterial symptoms. Pain with chewing but still able to eat. No sore throat. Notices mass over left mandible that is enlarging. I evaluated her at [employer] training room on 11/2/18 prior to the game where exam revealed tender, swollen left parotid and submandibular glands without erythema or fluctuance. No post OP erythema, neck supple without LAD here. She has a h/o axillary adenopathy last year. This resolved. No known exposures. Immunizations are UTD.          Medications:     Current Outpatient Prescriptions   Medication Sig     norethindrone-ethinyl estradiol-iron (ESTROSTEP FE) 1-20/1-30/1-35 MG-MCG per tablet Take 1 tablet by mouth daily     No current facility-administered medications for this visit.              Allergies:    No Known Allergies          Labs:     CBC, CRP, ESR normal  EBV IgG + and IgM negative consistent with prior infection  Mumps IgG negative consistent with non-immunity  Mumps IgM pending        Assessment:     Left parotid adenopathy - rule out mumps        Plan:     1. Await Mumps IgM - if +, will notify Department of Health and follow instructions on isolation and testing immune status of teammates/coaches/staff. She is likely no longer contagious now.  2. If Mumps IgM negative, continue to monitor sx. If increased pain, fluctuance/erythema/warmth, consider initiating antibiotic or imaging with CT.  3. She will need to repeat MMR vaccine if IgM negative.  4. In the meantime, continue sx care and soft diet.    Kaitlyn Vyas

## 2018-11-09 ENCOUNTER — RADIANT APPOINTMENT (OUTPATIENT)
Dept: CT IMAGING | Facility: CLINIC | Age: 18
End: 2018-11-09
Attending: PREVENTIVE MEDICINE
Payer: COMMERCIAL

## 2018-11-09 ENCOUNTER — OFFICE VISIT (OUTPATIENT)
Dept: FAMILY MEDICINE | Facility: CLINIC | Age: 18
End: 2018-11-09
Payer: COMMERCIAL

## 2018-11-09 VITALS
HEIGHT: 68 IN | SYSTOLIC BLOOD PRESSURE: 110 MMHG | DIASTOLIC BLOOD PRESSURE: 72 MMHG | TEMPERATURE: 97.7 F | BODY MASS INDEX: 23.16 KG/M2 | HEART RATE: 85 BPM | WEIGHT: 152.8 LBS

## 2018-11-09 DIAGNOSIS — K11.21 PAROTITIS, ACUTE: ICD-10-CM

## 2018-11-09 DIAGNOSIS — R22.1 LOCALIZED SWELLING, MASS AND LUMP, NECK: ICD-10-CM

## 2018-11-09 DIAGNOSIS — K11.21 PAROTITIS, ACUTE: Primary | ICD-10-CM

## 2018-11-09 LAB — MUV IGM SER IA-ACNC: 0.21 IV

## 2018-11-09 RX ORDER — LINEZOLID 600 MG/1
600 TABLET, FILM COATED ORAL 2 TIMES DAILY
Qty: 14 TABLET | Refills: 0 | Status: SHIPPED | OUTPATIENT
Start: 2018-11-09 | End: 2018-11-19

## 2018-11-09 RX ORDER — IOPAMIDOL 755 MG/ML
100 INJECTION, SOLUTION INTRAVASCULAR ONCE
Status: COMPLETED | OUTPATIENT
Start: 2018-11-09 | End: 2018-11-09

## 2018-11-09 RX ADMIN — IOPAMIDOL 100 ML: 755 INJECTION, SOLUTION INTRAVASCULAR at 13:47

## 2018-11-09 NOTE — PROGRESS NOTES
SUBJECTIVE: 18 year old female complaining of left neck lymphadenopathy, pain with chewing at left jaw line, and soft tissue mass at parotid  Had some recent testing labs     ROS: 10 point ROS neg other than the symptoms noted above in the HPI.      VSS, reviewed, afebrile  OBJECTIVE: The patient appears healthy, alert and no distress.   EARS: negative  NOSE/SINUS: Nares normal. Septum midline. Mucosa normal. No drainage or sinus tenderness.   THROAT: normal   NECK:Neck supple. Left anterior cervical lymph- adenopathy and enlarged parotid gland ttp with jaw clenching as well. Thyroid symmetric, normal size,, Carotids without bruits.   CHEST: Clear to auscultation    ASSESSMENT:   17 yo female with left parotitis, lymphadenopathy    PLAN:   Ordered ct neck with contrast  Start antibiotics  Will consider ENT referral if concerning CT  Discussed with Jason PORTILLO and athlete  Dr Caldera

## 2018-11-09 NOTE — MR AVS SNAPSHOT
After Visit Summary   11/9/2018    Berna Nichols    MRN: [id number]           Patient Information     Date Of Birth          2000        Visit Information        Provider Department      11/9/2018 8:45 AM Moses Caldera MD [facility]        Today's Diagnoses     Parotitis, acute    -  1    Localized swelling, mass and lump, neck           Follow-ups after your visit        Your next 10 appointments already scheduled     Nov 09, 2018  1:40 PM CST   CT SOFT TISSUE NECK W CONTRAST with UCCT2   [facility] ([facility])    [address] [address] [phone]           How do I prepare for my exam? (Food and drink instructions) **You will have contrast for this exam.** Do not eat or drink for 2 hours before your exam. If you need to take medicine, you may take it with small sips of water. (We may ask you to take liquid medicine as well.)  The day before your exam, drink extra fluids at least six 8-ounce glasses (unless your doctor tells you to restrict your fluids).  How do I prepare for my exam? (Other instructions) Patients over 70 or patients with diabetes or kidney problems: If you haven t had a blood test (creatinine test) within the last 30 days, the Cardiologist/Radiologist may require you to get this test prior to your exam.  What should I wear: Please wear loose clothing, such as a sweat suit or jogging clothes.  Avoid snaps, zippers and other metal. We may ask you to undress and put on a hospital gown.  How long does the exam take: Most scans take less than 20 minutes.  What should I bring: Please bring any scans or X-rays taken at other hospitals, if similar tests were done. Also bring a list of your medicines, including vitamins, minerals and over-the-counter drugs. It is safest to leave personal items at home.  Do I need a :  No  is needed.  What do I need to tell my  doctor? Be sure to tell your doctor: * If you have any allergies. * If there s any chance you are pregnant. * If you are breastfeeding. * If you have diabetes as your medication may need to be adjusted for this exam.  What should I do after the exam: No restrictions, You may resume normal activities.  What is this test: A CT (computed tomography) scan is a series of pictures that allows us to look inside your body. The scanner creates images of the body in cross sections, much like slices of bread. This helps us see any problems more clearly. You may receive contrast (X-ray dye) before or during your scan. Contrast is given through an IV (small needle in your arm).  Who should I call with questions: If you have any questions, please call the Imaging Department where you will have your exam. Directions, parking instructions, and other information is available on our website, [employer]/imaging.              Future tests that were ordered for you today     Open Future Orders        Priority Expected Expires Ordered    CT Soft Tissue Neck w Contrast Routine  11/9/2019 11/9/2018            Who to contact     Please call your clinic at [phone] to:    Ask questions about your health    Make or cancel appointments    Discuss your medicines    Learn about your test results    Speak to your doctor            Additional Information About Your Visit        [employer] Information     [employer] gives you secure access to your electronic health record. If you see a primary care provider, you can also send messages to your care team and make appointments. If you have questions, please call your primary care clinic.  If you do not have a primary care provider, please call [phone] and they will assist you.      [employer] is an electronic gateway that provides easy, online access to your medical records. With [employer], you can request a clinic appointment, read your test results, renew a prescription or communicate with your care  "team.     To access your existing account, please contact your [facility] or call [phone] for assistance.        Care EveryWhere ID     This is your Care EveryWhere ID. This could be used by other organizations to access your [place] medical records  [id number]        Your Vitals Were     Pulse Temperature Height Last Period BMI (Body Mass Index)       85 97.7  F (36.5  C) 1.727 m (5' 8\") 10/25/2018 (Approximate) 23.23 kg/m2        Blood Pressure from Last 3 Encounters:   11/09/18 110/72   06/06/18 109/75   05/25/18 122/71    Weight from Last 3 Encounters:   11/09/18 69.3 kg (152 lb 12.8 oz)   06/06/18 68 kg (150 lb)   05/25/18 67.6 kg (149 lb)                 Today's Medication Changes          These changes are accurate as of 11/9/18 11:24 AM.  If you have any questions, ask your nurse or doctor.               Start taking these medicines.        Dose/Directions    amoxicillin-clavulanate 875-125 MG per tablet   Commonly known as:  AUGMENTIN   Used for:  Parotitis, acute   Started by:  Moses Caldera MD        Dose:  1 tablet   Take 1 tablet by mouth 2 times daily   Quantity:  14 tablet   Refills:  0       linezolid 600 MG tablet   Commonly known as:  ZYVOX   Used for:  Parotitis, acute   Started by:  Moses Caldera MD        Dose:  600 mg   Take 1 tablet (600 mg) by mouth 2 times daily   Quantity:  14 tablet   Refills:  0            Where to get your medicines      These medications were sent to [place] [address] [place] [facility] [address]   [address] [place] [address]     Phone:  [phone]     amoxicillin-clavulanate 875-125 MG per tablet    linezolid 600 MG tablet                Primary Care Provider Office Phone # Fax #    Karina Mio [phone]       [facility] [place] [address]        Equal Access to Services     ANTHONY TENORIO AH: Teresa becko Soomaali, waaxda luqadaha, qaybta kaalmada adeegyada, anup " keisha foremanyenny jollyabisai cifuentes'aan ah. So [facility] [phone].    ATENCIÓN: Si marlene dugan, tiene a davila disposición servicios gratuitos de asistencia lingüística. Ely al [phone].    We comply with applicable federal civil rights laws and [place] laws. We do not discriminate on the basis of race, color, national origin, age, disability, sex, sexual orientation, or gender identity.            Thank you!     Thank you for choosing [facility]  for your care. Our goal is always to provide you with excellent care. Hearing back from our patients is one way we can continue to improve our services. Please take a few minutes to complete the written survey that you may receive in the mail after your visit with us. Thank you!             Your Updated Medication List - Protect others around you: Learn how to safely use, store and throw away your medicines at www.disposemymeds.org.          This list is accurate as of 11/9/18 11:24 AM.  Always use your most recent med list.                   Brand Name Dispense Instructions for use Diagnosis    amoxicillin-clavulanate 875-125 MG per tablet    AUGMENTIN    14 tablet    Take 1 tablet by mouth 2 times daily    Parotitis, acute       linezolid 600 MG tablet    ZYVOX    14 tablet    Take 1 tablet (600 mg) by mouth 2 times daily    Parotitis, acute       norethindrone-ethinyl estradiol-iron 1-20/1-30/1-35 MG-MCG per tablet    ESTROSTEP FE     Take 1 tablet by mouth daily

## 2018-11-09 NOTE — DISCHARGE INSTRUCTIONS

## 2018-11-09 NOTE — LETTER
11/9/2018      RE: Berna Nichols  [address] [facility] [address]       SUBJECTIVE: 18 year old female complaining of left neck lymphadenopathy, pain with chewing at left jaw line, and soft tissue mass at parotid  Had some recent testing labs     ROS: 10 point ROS neg other than the symptoms noted above in the HPI.      VSS, reviewed, afebrile  OBJECTIVE: The patient appears healthy, alert and no distress.   EARS: negative  NOSE/SINUS: Nares normal. Septum midline. Mucosa normal. No drainage or sinus tenderness.   THROAT: normal   NECK:Neck supple. Left anterior cervical lymph- adenopathy and enlarged parotid gland ttp with jaw clenching as well. Thyroid symmetric, normal size,, Carotids without bruits.   CHEST: Clear to auscultation    ASSESSMENT:   19 yo female with left parotitis, lymphadenopathy    PLAN:   Ordered ct neck with contrast  Start antibiotics  Will consider ENT referral if concerning CT  Discussed with Jason PORTILLO and athlete  Dr Vereince Caldera MD

## 2018-11-19 ENCOUNTER — OFFICE VISIT (OUTPATIENT)
Dept: FAMILY MEDICINE | Facility: CLINIC | Age: 18
End: 2018-11-19
Payer: COMMERCIAL

## 2018-11-19 VITALS
DIASTOLIC BLOOD PRESSURE: 76 MMHG | SYSTOLIC BLOOD PRESSURE: 116 MMHG | HEIGHT: 68 IN | HEART RATE: 59 BPM | WEIGHT: 150 LBS | BODY MASS INDEX: 22.73 KG/M2

## 2018-11-19 DIAGNOSIS — M26.609 TMJ (TEMPOROMANDIBULAR JOINT SYNDROME): Primary | ICD-10-CM

## 2018-11-19 RX ORDER — DICLOFENAC SODIUM 75 MG/1
75 TABLET, DELAYED RELEASE ORAL 2 TIMES DAILY PRN
Qty: 30 TABLET | Refills: 1 | Status: SHIPPED | OUTPATIENT
Start: 2018-11-19 | End: 2020-06-18

## 2018-11-19 NOTE — MR AVS SNAPSHOT
"              After Visit Summary   11/19/2018    Berna Nichols    MRN: [id number]           Patient Information     Date Of Birth          2000        Visit Information        Provider Department      11/19/2018 5:30 PM Moses Caldera MD [facility]        Today's Diagnoses     TMJ (temporomandibular joint syndrome)    -  1       Follow-ups after your visit        Who to contact     Please call your clinic at [phone] to:    Ask questions about your health    Make or cancel appointments    Discuss your medicines    Learn about your test results    Speak to your doctor            Additional Information About Your Visit        MyChart Information     [employer] gives you secure access to your electronic health record. If you see a primary care provider, you can also send messages to your care team and make appointments. If you have questions, please call your primary care clinic.  If you do not have a primary care provider, please call [phone] and they will assist you.      [employer] is an electronic gateway that provides easy, online access to your medical records. With [employer], you can request a clinic appointment, read your test results, renew a prescription or communicate with your care team.     To access your existing account, please contact your [facility] or call [phone] for assistance.        Care EveryWhere ID     This is your Care EveryWhere ID. This could be used by other organizations to access your [place] medical records  [id number]        Your Vitals Were     Pulse Height Last Period BMI (Body Mass Index)          59 1.727 m (5' 8\") 11/17/2018 22.81 kg/m2         Blood Pressure from Last 3 Encounters:   11/19/18 116/76   11/09/18 110/72   06/06/18 109/75    Weight from Last 3 Encounters:   11/19/18 68 kg (150 lb)   11/09/18 69.3 kg (152 lb 12.8 oz)   06/06/18 68 kg (150 lb)              Today, you had the following     No " orders found for display         Today's Medication Changes          These changes are accurate as of 11/19/18  9:44 PM.  If you have any questions, ask your nurse or doctor.               Start taking these medicines.        Dose/Directions    diclofenac 75 MG EC tablet   Commonly known as:  VOLTAREN   Used for:  TMJ (temporomandibular joint syndrome)        Dose:  75 mg   Take 1 tablet (75 mg) by mouth 2 times daily as needed for moderate pain   Quantity:  30 tablet   Refills:  1            Where to get your medicines      These medications were sent to [place] [address] [place] [facility] - [place], [place] - [address]  [address] , [facility] [address]     Phone:  [phone]     diclofenac 75 MG EC tablet                Primary Care Provider Office Phone # Fax #    Karina Lieberman [phone]       [facility] [facility] [address] [place] [address]        Equal Access to Services     [facility] : Teresa Cat, waaxda luqadaha, qaybta kaalmada adeegyajessica, anup lozano . So [facility] [phone].    ATENCIÓN: Si habla español, tiene a davila disposición servicios gratuitos de asistencia lingüística. Ely al [phone].    We comply with applicable federal civil rights laws and [place] laws. We do not discriminate on the basis of race, color, national origin, age, disability, sex, sexual orientation, or gender identity.            Thank you!     Thank you for choosing [facility]  for your care. Our goal is always to provide you with excellent care. Hearing back from our patients is one way we can continue to improve our services. Please take a few minutes to complete the written survey that you may receive in the mail after your visit with us. Thank you!             Your Updated Medication List - Protect others around you: Learn how to safely use, store and throw away your medicines at www.disposemymeds.org.          This list is accurate as of 11/19/18  9:44  PM.  Always use your most recent med list.                   Brand Name Dispense Instructions for use Diagnosis    diclofenac 75 MG EC tablet    VOLTAREN    30 tablet    Take 1 tablet (75 mg) by mouth 2 times daily as needed for moderate pain    TMJ (temporomandibular joint syndrome)       norethindrone-ethinyl estradiol-iron 1-20/1-30/1-35 MG-MCG per tablet    ESTROSTEP FE     Take 1 tablet by mouth daily

## 2018-11-19 NOTE — LETTER
11/19/2018      RE: Berna Nichols  [address] [facility] [address]       SUBJECTIVE: 18 year old female for followup from left jaw pain, which is improved, after having CT  Feeling better overall  Still has some pain with chewing but improving  Finished antibiotics unable to get the second one     ROS: 10 point ROS neg other than the symptoms noted above in the HPI.    VSS, reviwed    OBJECTIVE: The patient appears healthy, alert and no distress.   EARS: negative  NOSE/SINUS: Nares normal. Septum midline. Mucosa normal. No drainage or sinus tenderness.   THROAT: normal   NECK:Neck supple. No adenopathy. Thyroid symmetric, normal size,, Carotids without bruits.   CHEST: Clear to auscultation    ASSESSMENT:   19 yo female with left parotitis, possible wisdom tooth impaction      PLAN:  Given nsaids  Finish antibiotics  Discussed with atc and athlete    Dr Verenice Caldera MD

## 2018-11-20 NOTE — PROGRESS NOTES
SUBJECTIVE: 18 year old female for followup from left jaw pain, which is improved, after having CT  Feeling better overall  Still has some pain with chewing but improving  Finished antibiotics unable to get the second one     ROS: 10 point ROS neg other than the symptoms noted above in the HPI.    VSS, reviwed    OBJECTIVE: The patient appears healthy, alert and no distress.   EARS: negative  NOSE/SINUS: Nares normal. Septum midline. Mucosa normal. No drainage or sinus tenderness.   THROAT: normal   NECK:Neck supple. No adenopathy. Thyroid symmetric, normal size,, Carotids without bruits.   CHEST: Clear to auscultation    ASSESSMENT:   19 yo female with left parotitis, possible wisdom tooth impaction      PLAN:  Given nsaids  Finish antibiotics  Discussed with atc and athlete    Dr Caldera

## 2018-12-13 DIAGNOSIS — K11.20 PAROTITIS NOT DUE TO MUMPS: Primary | ICD-10-CM

## 2019-01-02 ENCOUNTER — OFFICE VISIT (OUTPATIENT)
Dept: FAMILY MEDICINE | Facility: CLINIC | Age: 19
End: 2019-01-02
Payer: COMMERCIAL

## 2019-01-02 VITALS
HEART RATE: 81 BPM | HEIGHT: 68 IN | SYSTOLIC BLOOD PRESSURE: 118 MMHG | WEIGHT: 150 LBS | BODY MASS INDEX: 22.73 KG/M2 | DIASTOLIC BLOOD PRESSURE: 70 MMHG

## 2019-01-02 DIAGNOSIS — L24.3 IRRITANT CONTACT DERMATITIS DUE TO COSMETICS: Primary | ICD-10-CM

## 2019-01-02 RX ORDER — TRIAMCINOLONE ACETONIDE 1 MG/G
CREAM TOPICAL 2 TIMES DAILY
Qty: 15 G | Refills: 0 | Status: SHIPPED | OUTPATIENT
Start: 2019-01-02 | End: 2020-06-18

## 2019-01-02 ASSESSMENT — MIFFLIN-ST. JEOR: SCORE: 1498.9

## 2019-01-02 NOTE — LETTER
1/2/2019      RE: Berna Nichols  [address] [facility] [address]       HPI:  Berna Nichols is a 18 year old female with left axilla rash    PMH:  No past medical history on file.    Active problem list:  Patient Active Problem List   Diagnosis     Oligomenorrhea     Acne vulgaris     Shoulder instability, right       FH:  No family history on file.    SH:  Social History     Socioeconomic History     Marital status: Single     Spouse name: Not on file     Number of children: Not on file     Years of education: Not on file     Highest education level: Not on file   Social Needs     Financial resource strain: Not on file     Food insecurity - worry: Not on file     Food insecurity - inability: Not on file     Transportation needs - medical: Not on file     Transportation needs - non-medical: Not on file   Occupational History     Not on file   Tobacco Use     Smoking status: Never Smoker     Smokeless tobacco: Never Used   Substance and Sexual Activity     Alcohol use: No     Drug use: No     Sexual activity: Yes     Partners: Male     Birth control/protection: Pill   Other Topics Concern     Parent/sibling w/ CABG, MI or angioplasty before 65F 55M? Not Asked   Social History Narrative     Not on file       MEDS:  See EMR, reviewed  ALL:  See EMR, reviewed    REVIEW OF SYSTEMS:  CONSTITUTIONAL:NEGATIVE for fever, chills, change in weight  INTEGUMENTARY/SKIN: NEGATIVE for worrisome rashes, moles or lesions  EYES: NEGATIVE for vision changes or irritation  ENT/MOUTH: NEGATIVE for ear, mouth and throat problems  RESP:NEGATIVE for significant cough or SOB  BREAST: NEGATIVE for masses, tenderness or discharge  CV: NEGATIVE for chest pain, palpitations or peripheral edema  GI: NEGATIVE for nausea, abdominal pain, heartburn, or change in bowel habits  :NEGATIVE for frequency, dysuria, or hematuria  :NEGATIVE for frequency, dysuria, or hematuria  NEURO: NEGATIVE for weakness, dizziness or  paresthesias  ENDOCRINE: NEGATIVE for temperature intolerance, skin/hair changes  HEME/ALLERGY/IMMUNE: NEGATIVE for bleeding problems  PSYCHIATRIC: NEGATIVE for changes in mood or affect      SUBJECTIVE:  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  It does not itch.  It seemed initially like a fungal infection, and she is being treated consistently over the last week and a half with topical Lamisil without improvement.  She does shave in this area.  She uses a normal deodorant.  She does not have a history in the past of eczema or irritated skin.      OBJECTIVE:  She has 3 separate nonspecific raised plaques with no central clearing and no satellite lesions, the largest the size of a quarter.  It is slightly salmon colored.  There are no signs of shaving folliculitis or abscess.  There are no tender areas.      ASSESSMENT:  Left axilla rash, suspect contact dermatitis.      PLAN:  Triamcinolone 0.1% cream applied twice daily.  She knows to wash hands after use and not get on lips or in eyes.  If the rash seems to worsen with this medicine, she will discontinue it.  Unfortunately, it is not likely to be useful to send a skin scraping at this point, as it will likely be obscured by her use of Lamisil over the last week and a half.       Benson Lance MD

## 2019-01-02 NOTE — PROGRESS NOTES
HPI:  Berna Nichols is a 18 year old female with left axilla rash    PMH:  No past medical history on file.    Active problem list:  Patient Active Problem List   Diagnosis     Oligomenorrhea     Acne vulgaris     Shoulder instability, right       FH:  No family history on file.    SH:  Social History     Socioeconomic History     Marital status: Single     Spouse name: Not on file     Number of children: Not on file     Years of education: Not on file     Highest education level: Not on file   Social Needs     Financial resource strain: Not on file     Food insecurity - worry: Not on file     Food insecurity - inability: Not on file     Transportation needs - medical: Not on file     Transportation needs - non-medical: Not on file   Occupational History     Not on file   Tobacco Use     Smoking status: Never Smoker     Smokeless tobacco: Never Used   Substance and Sexual Activity     Alcohol use: No     Drug use: No     Sexual activity: Yes     Partners: Male     Birth control/protection: Pill   Other Topics Concern     Parent/sibling w/ CABG, MI or angioplasty before 65F 55M? Not Asked   Social History Narrative     Not on file       MEDS:  See EMR, reviewed  ALL:  See EMR, reviewed    REVIEW OF SYSTEMS:  CONSTITUTIONAL:NEGATIVE for fever, chills, change in weight  INTEGUMENTARY/SKIN: NEGATIVE for worrisome rashes, moles or lesions  EYES: NEGATIVE for vision changes or irritation  ENT/MOUTH: NEGATIVE for ear, mouth and throat problems  RESP:NEGATIVE for significant cough or SOB  BREAST: NEGATIVE for masses, tenderness or discharge  CV: NEGATIVE for chest pain, palpitations or peripheral edema  GI: NEGATIVE for nausea, abdominal pain, heartburn, or change in bowel habits  :NEGATIVE for frequency, dysuria, or hematuria  :NEGATIVE for frequency, dysuria, or hematuria  NEURO: NEGATIVE for weakness, dizziness or paresthesias  ENDOCRINE: NEGATIVE for temperature intolerance, skin/hair  changes  HEME/ALLERGY/IMMUNE: NEGATIVE for bleeding problems  PSYCHIATRIC: NEGATIVE for changes in mood or affect      SUBJECTIVE:  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  It does not itch.  It seemed initially like a fungal infection, and she is being treated consistently over the last week and a half with topical Lamisil without improvement.  She does shave in this area.  She uses a normal deodorant.  She does not have a history in the past of eczema or irritated skin.      OBJECTIVE:  She has 3 separate nonspecific raised plaques with no central clearing and no satellite lesions, the largest the size of a quarter.  It is slightly salmon colored.  There are no signs of shaving folliculitis or abscess.  There are no tender areas.      ASSESSMENT:  Left axilla rash, suspect contact dermatitis.      PLAN:  Triamcinolone 0.1% cream applied twice daily.  She knows to wash hands after use and not get on lips or in eyes.  If the rash seems to worsen with this medicine, she will discontinue it.  Unfortunately, it is not likely to be useful to send a skin scraping at this point, as it will likely be obscured by her use of Lamisil over the last week and a half.

## 2019-02-25 ENCOUNTER — OFFICE VISIT (OUTPATIENT)
Dept: FAMILY MEDICINE | Facility: CLINIC | Age: 19
End: 2019-02-25
Payer: COMMERCIAL

## 2019-02-25 ENCOUNTER — ANCILLARY PROCEDURE (OUTPATIENT)
Dept: GENERAL RADIOLOGY | Facility: CLINIC | Age: 19
End: 2019-02-25
Attending: FAMILY MEDICINE
Payer: COMMERCIAL

## 2019-02-25 VITALS
WEIGHT: 148 LBS | BODY MASS INDEX: 22.43 KG/M2 | DIASTOLIC BLOOD PRESSURE: 82 MMHG | HEART RATE: 58 BPM | SYSTOLIC BLOOD PRESSURE: 123 MMHG | HEIGHT: 68 IN

## 2019-02-25 DIAGNOSIS — M79.672 FOOT PAIN, BILATERAL: ICD-10-CM

## 2019-02-25 DIAGNOSIS — M79.671 FOOT PAIN, BILATERAL: Primary | ICD-10-CM

## 2019-02-25 DIAGNOSIS — M79.671 FOOT PAIN, BILATERAL: ICD-10-CM

## 2019-02-25 DIAGNOSIS — M79.672 FOOT PAIN, BILATERAL: Primary | ICD-10-CM

## 2019-02-25 ASSESSMENT — MIFFLIN-ST. JEOR: SCORE: 1484.82

## 2019-02-25 NOTE — LETTER
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 R hurts more than left.     Numb for weekend, hurt to skate but not bad. (played on Friday and Saturday). Still hurts to walk but no pain in her toes now. Hurts when she bends her toe (pain at end of walk phase(push off) but definite improvement. Had xrays just before coming    Past medical, surgical and social hx reviewed.     OBJECTIVE    /82   Pulse 58   Ht 1.727 m (5' 8\")   Wt 67.1 kg (148 lb)   LMP 02/18/2019 (Approximate)   BMI 22.50 kg/m     EXAM:   Gen:  Pleasant female in no apparent distress, sitting/resting comfortably  MSK: ttp left foot plantar aspect of proximal phalanx. Mild tenderness over mtp as well. Pain with active toe raise    Right foot: ttp plantar and dorsal prox phalanx. Pain with passive dorsiflexion (end range of motion), pain with active toe raise (worse than other side).     xrays reviewed: 6 views obtained no fracture noted or other acute osseus abnormality     ASSESSMENT/PLAN    ICD-10-CM    1. Foot pain, bilateral M79.671     M79.672         No fracture seen on xray (bilateral obtained) consider walking boot or hard soled shoe to hasten protection and healing of the injury. Likely a capsular strain vs tendon strain. Return if not improving     Patient discussed with Dr. Brittny Armas    Seen with SO Sher ATC  Primary Care Sports Medicine Fellow        Attending Note:   I have discussed this patient and have reviewed the clinical presentation and progress note with the fellow. I agree with the treatment plan as outlined.   Brittny Armas MD, CAQ, CCD  [facility]  Sports Medicine and Bone Health    "

## 2019-02-25 NOTE — PROGRESS NOTES
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 R hurts more than left.     Numb for weekend, hurt to skate but not bad. (played on Friday and Saturday). Still hurts to walk but no pain in her toes now. Hurts when she bends her toe (pain at end of walk phase(push off) but definite improvement. Had xrays just before coming    Past medical, surgical and social hx reviewed.     OBJECTIVE    /82   Pulse 58   Ht 1.727 m (5' 8\")   Wt 67.1 kg (148 lb)   LMP 02/18/2019 (Approximate)   BMI 22.50 kg/m    EXAM:   Gen:  Pleasant female in no apparent distress, sitting/resting comfortably  MSK: ttp left foot plantar aspect of proximal phalanx. Mild tenderness over mtp as well. Pain with active toe raise    Right foot: ttp plantar and dorsal prox phalanx. Pain with passive dorsiflexion (end range of motion), pain with active toe raise (worse than other side).     xrays reviewed: 6 views obtained no fracture noted or other acute osseus abnormality     ASSESSMENT/PLAN    ICD-10-CM    1. Foot pain, bilateral M79.671     M79.672         No fracture seen on xray (bilateral obtained) consider walking boot or hard soled shoe to hasten protection and healing of the injury. Likely a capsular strain vs tendon strain. Return if not improving     Patient discussed with Dr. Brittny Armas    Seen with SO Sher ATC  Primary Care Sports Medicine Fellow      "

## 2019-02-28 NOTE — PROGRESS NOTES
Attending Note:   I have discussed this patient and have reviewed the clinical presentation and progress note with the fellow. I agree with the treatment plan as outlined.   Brittny Armas MD, CAQ, CCD  [facility]  Sports Medicine and Bone Health

## 2019-05-03 ENCOUNTER — HEALTH MAINTENANCE LETTER (OUTPATIENT)
Age: 19
End: 2019-05-03

## 2019-11-11 DIAGNOSIS — L73.9 FOLLICULITIS: Primary | ICD-10-CM

## 2019-11-11 RX ORDER — CEPHALEXIN 500 MG/1
500 CAPSULE ORAL 2 TIMES DAILY
Qty: 20 CAPSULE | Refills: 0 | Status: SHIPPED | OUTPATIENT
Start: 2019-11-11 | End: 2019-11-21

## 2019-11-12 NOTE — PROGRESS NOTES
Lower leg with follicular rash persisting and spreading despite topical bacitracin and skin hygiene.      Folliculitis  Keflex 500 mg PO BID x 10days

## 2019-11-16 ENCOUNTER — OFFICE VISIT (OUTPATIENT)
Dept: ORTHOPEDICS | Facility: CLINIC | Age: 19
End: 2019-11-16
Payer: COMMERCIAL

## 2019-11-16 DIAGNOSIS — M24.20 LIGAMENTOUS LAXITY OF MULTIPLE SITES: Primary | ICD-10-CM
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
## 2019-11-17 ENCOUNTER — DOCUMENTATION ONLY (OUTPATIENT)
Dept: FAMILY MEDICINE | Facility: CLINIC | Age: 19
End: 2019-11-17

## 2019-11-18 NOTE — PROGRESS NOTES
"[facility] ATHLETICS  [facility] ATC initial assessment note  Date of service performed: 11/16/19    Concern: Acute injury  Body part: Shoulder  Description: Left  Injury: Subluxation  Type: Athletics related  Date of injury: 11/16/19    S: Jeanmarie was playing home game vs. [employer] when her left shoulder went awkwardly into the boards. She stayed down for a few moments before skating off on her own to the bench. She reports her shoulder \"slid out a little\" but had reduced on its own. She denied numbness or tingling and had full  strength. She was able to complete second period and then came into ATR for evaluation by Dr. Zamarripa.     History of subluxation on right side in high school. Has not had any significant shoulder subluxations while at the [employer].     O: Evaluation performed by Dr. Zamarripa. Full ROM and strength. Does have scapular dyskinesia bilaterally.     A: Left shoulder subluxation, reduced spontaneously.    P: Cleared to continue playing by Dr. Zamarripa. Finished game and came in afterwards for ice. Will re-evaluate Monday prior to practice.    Marcia Redman, ATC          "

## 2019-12-02 NOTE — PROGRESS NOTES
[facility] ATHLETICS  Dar ATC follow-up note  Date of service performed: 12/2/19    Concern/injury: Left shoulder    Assessment/plan: Jeanmarie continues to do daily rehab and wear her trang brace for practices and games. Has not had any new instability events.     Marcia Redman, ATC

## 2019-12-02 NOTE — PROGRESS NOTES
[facility] ATHLETICS  Dar rehab calendar    Berna Nichols  Injury requiring rehab: Left shoulder    Below is the rehab calendar for the week of 11/18-11/22.    Sun Mon Tues Wed Thurs Fri Sat      Is Ys Ts 2x8 each 1#    Standing rows - pink theraband 2x15    Wall walks yellow theraband 2x5    90/90 pink theraband 2x15     Is Ys Ts 2x8 each 1#    Standing rows - pink theraband 2x15    Wall walks yellow theraband 2x5    90/90 pink theraband 2x15   Is Ys Ts 2x8 each 1#    Standing rows - pink theraband 2x15    Wall walks yellow theraband 2x5    90/90 pink theraband 2x15   Is Ys Ts 2x8 each 1#    Standing rows - pink theraband 2x15    Wall walks yellow theraband 2x5    90/90 pink theraband 2x15       Jeanmarie was non-contact during practice on Monday. She began wearing a [place] brace on Tuesday and practiced full contact. Has modified in weight room to decrease weight with overhead lifts and bench press.    Marcia Redman, ATC

## 2019-12-07 DIAGNOSIS — L24.9 IRRITANT CONTACT DERMATITIS, UNSPECIFIED TRIGGER: Primary | ICD-10-CM

## 2019-12-07 RX ORDER — MOMETASONE FUROATE 1 MG/G
OINTMENT TOPICAL DAILY
Qty: 15 G | Refills: 0 | Status: SHIPPED | OUTPATIENT
Start: 2019-12-07 | End: 2020-01-13

## 2019-12-07 NOTE — PROGRESS NOTES
Lower leg with itchy rash present for a few weeks. Not responsive to topical hydrocortisone cream.    PE:  Lower leg with slightly erythematous, raised rash without vesicles or pustules    A: Contact derm  P: mometasone ointment BID until rash clears  F/u if persists or worsens    Kaitlyn Vyas MD
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
## 2020-01-13 DIAGNOSIS — L24.9 IRRITANT CONTACT DERMATITIS, UNSPECIFIED TRIGGER: ICD-10-CM

## 2020-01-13 RX ORDER — MOMETASONE FUROATE 1 MG/G
OINTMENT TOPICAL DAILY
Qty: 15 G | Refills: 0 | Status: SHIPPED | OUTPATIENT
Start: 2020-01-13

## 2020-01-13 NOTE — PROGRESS NOTES
Previous rash on lower leg resolved with topical mometasone. Has now recently returned.    Refilled mometasone ointment.  Follow up if sx worsen/fail to improve.    Kaitlyn Vyas MD

## 2020-01-24 NOTE — PROGRESS NOTES
[facility] ATHLETICS  Dar ATC follow-up note  Date of service performed: 1/24/2020    Concern/injury: Left shoulder    Assessment/plan: Jeanmarie has had no complaints with her shoulder since returning from break.     Marcia Redman, ATC

## 2020-02-05 ENCOUNTER — DOCUMENTATION ONLY (OUTPATIENT)
Dept: FAMILY MEDICINE | Facility: CLINIC | Age: 20
End: 2020-02-05

## 2020-02-05 ENCOUNTER — OFFICE VISIT (OUTPATIENT)
Dept: ORTHOPEDICS | Facility: CLINIC | Age: 20
End: 2020-02-05
Payer: COMMERCIAL

## 2020-02-05 VITALS
HEART RATE: 93 BPM | HEIGHT: 68 IN | SYSTOLIC BLOOD PRESSURE: 114 MMHG | BODY MASS INDEX: 23.61 KG/M2 | WEIGHT: 155.8 LBS | DIASTOLIC BLOOD PRESSURE: 77 MMHG

## 2020-02-05 DIAGNOSIS — M77.52 BURSITIS OF LEFT ANKLE: Primary | ICD-10-CM

## 2020-02-05 ASSESSMENT — MIFFLIN-ST. JEOR: SCORE: 1520.2

## 2020-02-05 NOTE — PROGRESS NOTES
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 Pain with pressure over area and in skate. Swelling diminishes with compression. No MATY. No redness.          Medications:     Current Outpatient Medications   Medication Sig     norethindrone-ethinyl estradiol-iron (ESTROSTEP FE) 1-20/1-30/1-35 MG-MCG per tablet Take 1 tablet by mouth daily     diclofenac (VOLTAREN) 75 MG EC tablet Take 1 tablet (75 mg) by mouth 2 times daily as needed for moderate pain (Patient not taking: Reported on 2/25/2019)     mometasone (ELOCON) 0.1 % external ointment Apply topically daily (Patient not taking: Reported on 2/5/2020)     No current facility-administered medications for this visit.              Allergies:    No Known Allergies         Review of Systems:   A comprehensive 10 point review of systems (constitutional, ENT, cardiac, peripheral vascular, respiratory, GI, , Musculoskeletal, skin, Neurological) was performed and found to be negative except as described in this note.           Physical Exam:   COMPLETE EXAMINATION:   VITAL SIGNS: /77   Pulse 93   Ht 1.727 m (5' 8\")   Wt 70.7 kg (155 lb 12.8 oz)   LMP 01/21/2020   BMI 23.69 kg/m    GEN: Alert, well-nourished, and in no distress.   NEURO: Alert and oriented to person, place, and time. Gait normal.   SKIN: No rash, warmth or erythema  PSYCH: Mood, memory, affect and judgment normal.   MSK: Left ankle: small area of swelling over the medial malleolus, TTP        Procedure Note:     After discussion of risks, benefits and side effects, Jeanmarie Nichols consented to a left ankle bursa aspiration and steroid injection. Under sterile technique, 1 ml 1% lidocaine (Lot -DK; NDC 1300-2879-64) and 1cc Kenalog 40 mg/ml (Lot WHITNEY [id number]; NDC " 6643-4795-03) were injected into the area. The procedure was well tolerated and without complication.        Assessment:     Left ankle bursitis        Plan:     1. Compression dressing applied.  2. Follow up prn.    Kaitlyn Vyas M.D.    ATC was not present for the entire visit.

## 2020-02-05 NOTE — LETTER
"  2/5/2020      RE: Berna Nichols  [address] [facility] [address]       [facility] FOLLOW UP    Berna Nichols MRN# [id number]   Age: 19 year old YOB: 2000           Chief Complaint:     Left ankle bursitis          History of Present Illness:     18 yo [employer] developed swollen area over left medial malleolus. Pain with pressure over area and in skate. Swelling diminishes with compression. No MATY. No redness.          Medications:     Current Outpatient Medications   Medication Sig     norethindrone-ethinyl estradiol-iron (ESTROSTEP FE) 1-20/1-30/1-35 MG-MCG per tablet Take 1 tablet by mouth daily     diclofenac (VOLTAREN) 75 MG EC tablet Take 1 tablet (75 mg) by mouth 2 times daily as needed for moderate pain (Patient not taking: Reported on 2/25/2019)     mometasone (ELOCON) 0.1 % external ointment Apply topically daily (Patient not taking: Reported on 2/5/2020)     No current facility-administered medications for this visit.              Allergies:    No Known Allergies         Review of Systems:   A comprehensive 10 point review of systems (constitutional, ENT, cardiac, peripheral vascular, respiratory, GI, , Musculoskeletal, skin, Neurological) was performed and found to be negative except as described in this note.           Physical Exam:   COMPLETE EXAMINATION:   VITAL SIGNS: /77   Pulse 93   Ht 1.727 m (5' 8\")   Wt 70.7 kg (155 lb 12.8 oz)   LMP 01/21/2020   BMI 23.69 kg/m     GEN: Alert, well-nourished, and in no distress.   NEURO: Alert and oriented to person, place, and time. Gait normal.   SKIN: No rash, warmth or erythema  PSYCH: Mood, memory, affect and judgment normal.   MSK: Left ankle: small area of swelling over the medial malleolus, TTP        Procedure Note:     After discussion of risks, benefits and side effects, Jeanmarie Nichols consented to a left ankle bursa aspiration and steroid injection. Under sterile technique, 1 ml 1% " lidocaine (Lot -DK; NDC 6442-4789-54) and 1cc Kenalog 40 mg/ml (Lot WHITNEY [id number]; NDC 8572-1073-52) were injected into the area. The procedure was well tolerated and without complication.        Assessment:     Left ankle bursitis        Plan:     1. Compression dressing applied.  2. Follow up prn.    Kaitlyn Vyas M.D.    ATC was not present for the entire visit.      Kaitlyn Vyas MD

## 2020-02-05 NOTE — PROGRESS NOTES
[facility] ATHLETICS  Dar ATC initial assessment note  Date of service performed: 2/3/2020    Concern: Acute injury  Body part: Ankle  Description: Left  Injury: Bursitis  Type: Athletics related    S: Jeanmarie presents to ATR c/o of swollen left ankle that has been worsening over the past week. No trauma to area that she can recall, but my have blocked a shot on lateral ankle. She now is having pain when wearing her skate from pressure on lateral ankle. This is not preventing her from full activity on the ice or in the weight room. She is inquiring about getting it drained.     O: Pocket of fluid over lateral malleolus. No tenderness to touch. No redness or signs of infection. Full ROM and strength of ankle.     A: Left ankle bursitis.    P: Sent picture to Dr. Vyas who agrees it is bursitis. Jeanmarie will see her on Wednesday to discuss treatment options. May pad for comfort when skating.     Marcia Redman, ATC

## 2020-02-10 RX ORDER — LIDOCAINE HYDROCHLORIDE 10 MG/ML
1 INJECTION, SOLUTION INFILTRATION; PERINEURAL ONCE
Status: DISPENSED | OUTPATIENT
Start: 2020-02-10

## 2020-02-10 RX ORDER — TRIAMCINOLONE ACETONIDE 40 MG/ML
40 INJECTION, SUSPENSION INTRA-ARTICULAR; INTRAMUSCULAR ONCE
Status: DISPENSED | OUTPATIENT
Start: 2020-02-10

## 2020-02-11 ENCOUNTER — OFFICE VISIT (OUTPATIENT)
Dept: FAMILY MEDICINE | Facility: CLINIC | Age: 20
End: 2020-02-11
Payer: COMMERCIAL

## 2020-02-11 VITALS
HEART RATE: 73 BPM | BODY MASS INDEX: 24.04 KG/M2 | DIASTOLIC BLOOD PRESSURE: 82 MMHG | HEIGHT: 68 IN | SYSTOLIC BLOOD PRESSURE: 129 MMHG | WEIGHT: 158.6 LBS

## 2020-02-11 DIAGNOSIS — M77.52 BURSITIS OF LEFT ANKLE: Primary | ICD-10-CM

## 2020-02-11 ASSESSMENT — MIFFLIN-ST. JEOR: SCORE: 1532.9

## 2020-02-11 NOTE — LETTER
"  2/11/2020      RE: Berna Nichols  [address] [facility] [address]       Attending Note:   I have personally examined this patient and have reviewed the clinical presentation and progress note with the fellow. I agree with the treatment plan as outlined. The plan was formulated with the fellow on the day of the patient's visit. I have reviewed all imaging with the fellow and agree with the findings in the documentation. I have supervised the aspiration done under [facility] US.      Brittny Armas MD, CAQ, CCD  [facility]  Sports Medicine and Bone Health    S: 18 yo [facility] women's  here for evaluation and poossible aspiration of left medial malleolar bursitis.  Saw Dr. Vyas and had a CSI injection that was temporarily helpful.  This area continues to swell up and compression does help improve swelling.  Has not placed any padding over this area when skating.    O: /82   Pulse 73   Ht 1.727 m (5' 8\")   Wt 71.9 kg (158 lb 9.6 oz)   LMP 01/21/2020   BMI 24.12 kg/m     GEN: pleasant, NAD  LEFT ANKLE: FROM and strength.  Swelling over the medial malleolus with mild TTP.    A/P: [employer] [facility] [employer] women's  with recurrent swelling of her medial malleolar bursa.  CSI injection was only temporarily beneficial.  After discuss of risks and benefits of ankle aspiration.  Informed consent was obtained and she was agreeable to try US guided aspiration.  Area was injected with 1.5 ml of 1% lidocaine.  Under US guidance and using an 18 gauge needle, approximately 2 ml of serous fluid was aspirated.  She tolerated this well and the area was covered with a bandage.    -Compression  -Add padding to medial malleolus when skating    Marcia Redman ATC was present for entire visit.    Seen and discussed with Dr. Armas.    Gogo Dupont DO  Primary Care Sports Medicine Fellow      Addendum:  The bursa reaccumulated fluid today.  The padding over the medial malleolus was uncomfortable in her skate. ATC " will try a donut pad type of padding to see if this is better tolerated.  Will prescribe prednisone 40 mg q day with food for 7 days to see if this helps to manage the bursitis.  Communicated with Marcia Redman ATC and she will discuss it with Jeanmarie.     Brittny Armas MD, CAQ, FACSM, CCD  [facility]  Sports Medicine and Bone Health  Team Physician;  Athletics          Brittny Armas MD

## 2020-02-11 NOTE — PROGRESS NOTES
Attending Note:   I have personally examined this patient and have reviewed the clinical presentation and progress note with the fellow. I agree with the treatment plan as outlined. The plan was formulated with the fellow on the day of the patient's visit. I have reviewed all imaging with the fellow and agree with the findings in the documentation. I have supervised the aspiration done under [facility] US.      Brittny Armas MD, CAQ, CCD  [facility]  Sports Medicine and Bone Health

## 2020-02-12 NOTE — PROGRESS NOTES
[facility] ATHLETICS  Dar ATC follow-up note  Date of service performed: 2/10/2020    Concern/injury: Left ankle bursitis    Jeanmarie presents to ATR with swelling in left lateral ankle again after she saw Dr. Vyas last Wednesday and received steroid injection to try to help with bursitis. At time of visit with physician, she did not have much swelling, as it was after practice. Today, ankle is back to previous amount of swelling.     After discussing with Dr. Vyas, decided best option is to have it looked at under ultrasound and possibly aspirated. She will do this before practice tomorrow with Dr. Armas.    Marcia Redman, ATC

## 2020-02-13 RX ORDER — PREDNISONE 20 MG/1
40 TABLET ORAL DAILY
Qty: 14 TABLET | Refills: 0 | Status: SHIPPED | OUTPATIENT
Start: 2020-02-13 | End: 2020-06-18

## 2020-02-13 NOTE — PROGRESS NOTES
"S: 20 yo [employer] women's  here for evaluation and poossible aspiration of left medial malleolar bursitis.  Saw Dr. Vyas and had a CSI injection that was temporarily helpful.  This area continues to swell up and compression does help improve swelling.  Has not placed any padding over this area when skating.    O: /82   Pulse 73   Ht 1.727 m (5' 8\")   Wt 71.9 kg (158 lb 9.6 oz)   LMP 01/21/2020   BMI 24.12 kg/m    GEN: pleasant, NAD  LEFT ANKLE: FROM and strength.  Swelling over the medial malleolus with mild TTP.    A/P: [employer] women's  with recurrent swelling of her medial malleolar bursa.  CSI injection was only temporarily beneficial.  After discuss of risks and benefits of ankle aspiration.  Informed consent was obtained and she was agreeable to try US guided aspiration.  Area was injected with 1.5 ml of 1% lidocaine.  Under US guidance and using an 18 gauge needle, approximately 2 ml of serous fluid was aspirated.  She tolerated this well and the area was covered with a bandage.    -Compression  -Add padding to medial malleolus when skating    Marcia Redman ATC was present for entire visit.    Seen and discussed with Dr. Armas.    Gogo Dupont DO  Primary Care Sports Medicine Fellow      Addendum:  The bursa reaccumulated fluid today.  The padding over the medial malleolus was uncomfortable in her skate. ATC will try a donut pad type of padding to see if this is better tolerated.  Will prescribe prednisone 40 mg q day with food for 7 days to see if this helps to manage the bursitis.  Communicated with Marcia Redman ATC and she will discuss it with Jeanmarie.     Brittny Armas MD, CAQ, FACSM, CCD  [facility]  Sports Medicine and Bone Health  Team Physician;  Athletics        "

## 2020-02-18 NOTE — PROGRESS NOTES
[facility] ATHLETICS  Dar ATC follow-up note  Date of service performed: 2/17/2020    Concern/injury: Left ankle bursitis    Jeanmarie comes into ATR today with increased swelling over medial malleolus (worse than prior to aspiration last week). She picked up rx for prednisone yesterday and began taking it.     Assessment/plan: Applied kinesiotape over medial malleolus to try to reduce swelling. Continue taking prednisone.    Marcia Redman, ATC

## 2020-02-18 NOTE — PROGRESS NOTES
[facility] ATHLETICS  Dar ATC follow-up note  Date of service performed: 2/13/2020    Concern/injury: Left ankle bursitis    Jeanmarie was seen by Dr. Armas and Dr. Dupont on 2/11/2020 and had approximately 2 ml of fluid drained from her ankle.     Today, she comes in with swelling re-occurring over medial malleolus. After discussing with Dr. Armas via text, plan to have Jeanmarie begin prednisone for 7 days to see if it helps with swelling.      Marcia Redman, ATC

## 2020-03-10 ENCOUNTER — HEALTH MAINTENANCE LETTER (OUTPATIENT)
Age: 20
End: 2020-03-10

## 2020-05-28 NOTE — PROGRESS NOTES
[facility] ATHLETICS  [facility] ATC follow-up note  Date of service performed: 5/28/2020    Concern/injury: Left ankle bursitis    Jeanmarie filled out exit evaluation on April 10th, putting that she would like to see physician for her ankle. However, due to COVID-19, she has not been able to see someone yet.     She texted me last night that swelling and pain had increased after paddle boarding this past weekend. Pain has decreased slightly since then but still very swollen.     I FaceTimed with her today. Bursa over medial malleolus is extremely swollen. Has pain over area as well as around malleolus and into calcaneus. Able to WB but says it's uncomfortable.    Assessment/plan: Discussed with Dr. Armas, who will see her next week at the [facility] (as part of exit evaluation) for an in person appointment to drain it.    Marcia Redman, ATC

## 2020-06-05 ENCOUNTER — TELEPHONE (OUTPATIENT)
Dept: ORTHOPEDICS | Facility: CLINIC | Age: 20
End: 2020-06-05

## 2020-06-05 ENCOUNTER — OFFICE VISIT (OUTPATIENT)
Dept: ORTHOPEDICS | Facility: CLINIC | Age: 20
End: 2020-06-05
Payer: COMMERCIAL

## 2020-06-05 DIAGNOSIS — M76.822 LEFT TIBIALIS POSTERIOR TENDONITIS: Primary | ICD-10-CM

## 2020-06-05 DIAGNOSIS — M77.52 BURSITIS OF LEFT ANKLE: ICD-10-CM

## 2020-06-05 NOTE — PROGRESS NOTES
Attending Note:   I have personally examined this patient and have reviewed the clinical presentation and progress note with the fellow. I agree with the treatment plan as outlined. The plan was formulated with the fellow on the day of the patient's visit. I have reviewed all imaging with the fellow and agree with the findings in the documentation.     Brittny Armas MD, CAQ, CCD  [facility]  Sports Medicine and Bone Health

## 2020-06-05 NOTE — TELEPHONE ENCOUNTER
----- Message from Renate Eisenberg RN sent at 6/5/2020  3:33 PM CDT -----  Regarding: RE: [employer] needing to see Henry  There is one opening on Tuesday in clinic at 7:20 AM.  You can move here there.  Dr Figueroa needs to be out of clinic in the afternoon on Tuesday due to limitations for provides to half day.  Renate  ----- Message -----  From: Samantha Mead, ATC  Sent: 6/5/2020   3:26 PM CDT  To: Renate Eisenberg RN, Sarah Padgett ATC  Subject: [employer] needing to see Henry               We have this patient scheduled to see Henry on Tuesday as a virtual visit for subluxing posterior tibialis tendon, but Dr. Armas prefers she is seen in person. She will have a MRI done on Monday. Can we possibly fit her onto Henry's schedule in person on Tuesday?    Hi Brewer

## 2020-06-05 NOTE — TELEPHONE ENCOUNTER
LVM offering in person appointment at 7:20 am on Tuesday, 6/9. Left call back number to discuss this.

## 2020-06-05 NOTE — LETTER
"  6/5/2020      RE: Berna Nichols  [address] [facility] [address]       Subjective:   Berna Nichols is a 20 year old female who is here for follow up of left ankle medial malleolus bursitis.     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     Previously hadd a CSI injection for this issue in Feb, as well as an aspiration one week later. Since season ended (and no access to ice time currently due to pandemic) her ankle had been feeling much better until she hit her medial malleolus on the edge of a paddleboard two weeks ago. She reports being unable to walk for the first 1-2 days, and reaccumulation of the fluid collection she had previously.     She does note a \"Snapping\" sensation occasionally over inside of the ankle, and also notes more pain wrapping around med mall and up medial calf than she had before.       PAST MEDICAL, SOCIAL, SURGICAL AND FAMILY HISTORY: She  has no past medical history of Arthritis, Bleeding disorder (H), Cancer (H), Cerebral infarction (H), Chronic kidney disease, Degenerative joint disease, Diabetes (H), Heart disease, Hepatitis, Hypertension, Surgical complication, or Uncomplicated asthma.  She  has a past surgical history that includes Arthroscopic reconstruction anterior cruciate ligament (Left, 12/21/2016); Arthroscopic reconstruction anterior cruciate ligament (Left, 12/21/2016); and Arthroscopy Knee With Meniscectomy (Left, 6/6/2018).  Her family history is not on file.  She reports that she has never smoked. She has never used smokeless tobacco. She reports that she does not drink alcohol or use drugs.      PROBLEM LIST:   Patient Active Problem List   Diagnosis     Oligomenorrhea     Acne vulgaris     Shoulder instability, right       ALLERGIES: She has No Known Allergies.    CURRENT MEDICATIONS: She has a current medication list which includes the following prescription(s): norethindrone-ethinyl " "estradiol-iron, diclofenac, and mometasone, and the following Facility-Administered Medications: lidocaine and triamcinolone.     REVIEW OF SYSTEMS: 9 point review of systems is negative except as noted above.     Exam:   There were no vitals taken for this visit.     Alert, NAD  NC/AT  Sclerae anicteric  Resp nonlabored  Skin warm and dry  No focal neuro deficits. Speech intact.   Appropriate affect    Left ankle with promient, well circumscribed subcutaneous fluid collection directly over medial malleolus. Mildly erythematous and TTP. Normal ankle ROM, but palpable snapping over posterior med mall with resisted inversion of the foot.   Nl arch, negative \"too many toes sign\"  CMS intact distally.       Ultrasound of the area today shows septate medial mallelolus bursal fluid as well as repeated subluxation of the tibialis posterior tendon with activation, and evidence of associated tenosynovitis. Unable to fully assess PT tendon integrity.      No new imaging studies.       Assessment/Plan:   1. Chronic bursitis of left ankle  2. New subluxing left tibialis posterior tendon with tendonitis  Reviewed ultrasound findings with patient and concern that  Recommend MRI to further evaluate and follow up with Dr. Figueroa early next week given patient's plans to begin professional career in August. Will defer aspiration/injection pending imaging and Dr. Figueroa's recommendations, happy to see her for this in the future at any time. Patient in agreement with this plan.   - MR Ankle Left w/o Contrast; Future  - Orthopedic & Spine  Referral; Future       Seen and discussed with Dr. Armas.     Elliott Darnell MD  Primary Care Sports Medicine Fellow        Attending Note:   I have personally examined this patient and have reviewed the clinical presentation and progress note with the fellow. I agree with the treatment plan as outlined. The plan was formulated with the fellow on the day of the patient's visit. I have reviewed " all imaging with the fellow and agree with the findings in the documentation.     Brittny Armas MD, CAQ, CCD  [facility]  Sports Medicine and Bone Health    Brittny Armas MD

## 2020-06-05 NOTE — PROGRESS NOTES
"Subjective:   Berna Nichols is a 20 year old female who is here for follow up of left ankle medial malleolus bursitis.     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     Previously hadd a CSI injection for this issue in Feb, as well as an aspiration one week later. Since season ended (and no access to ice time currently due to pandemic) her ankle had been feeling much better until she hit her medial malleolus on the edge of a paddleboard two weeks ago. She reports being unable to walk for the first 1-2 days, and reaccumulation of the fluid collection she had previously.     She does note a \"Snapping\" sensation occasionally over inside of the ankle, and also notes more pain wrapping around med mall and up medial calf than she had before.       PAST MEDICAL, SOCIAL, SURGICAL AND FAMILY HISTORY: She  has no past medical history of Arthritis, Bleeding disorder (H), Cancer (H), Cerebral infarction (H), Chronic kidney disease, Degenerative joint disease, Diabetes (H), Heart disease, Hepatitis, Hypertension, Surgical complication, or Uncomplicated asthma.  She  has a past surgical history that includes Arthroscopic reconstruction anterior cruciate ligament (Left, 12/21/2016); Arthroscopic reconstruction anterior cruciate ligament (Left, 12/21/2016); and Arthroscopy Knee With Meniscectomy (Left, 6/6/2018).  Her family history is not on file.  She reports that she has never smoked. She has never used smokeless tobacco. She reports that she does not drink alcohol or use drugs.      PROBLEM LIST:   Patient Active Problem List   Diagnosis     Oligomenorrhea     Acne vulgaris     Shoulder instability, right       ALLERGIES: She has No Known Allergies.    CURRENT MEDICATIONS: She has a current medication list which includes the following prescription(s): norethindrone-ethinyl estradiol-iron, diclofenac, and mometasone, and the following Facility-Administered " "Medications: lidocaine and triamcinolone.     REVIEW OF SYSTEMS: 9 point review of systems is negative except as noted above.     Exam:   There were no vitals taken for this visit.     Alert, NAD  NC/AT  Sclerae anicteric  Resp nonlabored  Skin warm and dry  No focal neuro deficits. Speech intact.   Appropriate affect    Left ankle with promient, well circumscribed subcutaneous fluid collection directly over medial malleolus. Mildly erythematous and TTP. Normal ankle ROM, but palpable snapping over posterior med mall with resisted inversion of the foot.   Nl arch, negative \"too many toes sign\"  CMS intact distally.       Ultrasound of the area today shows septate medial mallelolus bursal fluid as well as repeated subluxation of the tibialis posterior tendon with activation, and evidence of associated tenosynovitis. Unable to fully assess PT tendon integrity.      No new imaging studies.       Assessment/Plan:   1. Chronic bursitis of left ankle  2. New subluxing left tibialis posterior tendon with tendonitis  Reviewed ultrasound findings with patient and concern that  Recommend MRI to further evaluate and follow up with Dr. Figueroa early next week given patient's plans to begin professional career in August. Will defer aspiration/injection pending imaging and Dr. Figueroa's recommendations, happy to see her for this in the future at any time. Patient in agreement with this plan.   - MR Ankle Left w/o Contrast; Future  - Orthopedic & Spine  Referral; Future       Seen and discussed with Dr. Armas.     Elliott Darnell MD  Primary Care Sports Medicine Fellow      "

## 2020-06-07 NOTE — TELEPHONE ENCOUNTER
DIAGNOSIS: Left tibialis posterior tendonitis. Might be changed to in person   APPOINTMENT DATE: 6.9.20   NOTES STATUS DETAILS   OFFICE NOTE from referring provider Internal 6.5.20, 2.11.20,  Dr. Brittny Armas  [facility] Sports Medicine   OFFICE NOTE from other specialist Internal 2.5.20  Dr. Kaitlyn Vyas  [facility] Sports Medicine   DISCHARGE SUMMARY from hospital N/A    DISCHARGE REPORT from the ER N/A    OPERATIVE REPORT N/A    MEDICATION LIST Internal    EMG (for Spine) N/A    IMPLANT RECORD/STICKER N/A    LABS     CBC/DIFF N/A    CULTURES N/A    INJECTIONS DONE IN RADIOLOGY Internal 2.11.20, 2.5.20   MRI In process *sched* for 6.8.20  MR Left Ankle   CT SCAN N/A    XRAYS (IMAGES & REPORTS) Internal 2.25.19  Xray Bilateral Feet   TUMOR     PATHOLOGY  Slides & report N/A

## 2020-06-08 ENCOUNTER — HOSPITAL ENCOUNTER (OUTPATIENT)
Dept: MRI IMAGING | Facility: CLINIC | Age: 20
Discharge: HOME | End: 2020-06-08
Attending: FAMILY MEDICINE | Admitting: FAMILY MEDICINE
Payer: COMMERCIAL

## 2020-06-08 DIAGNOSIS — M77.52 BURSITIS OF LEFT ANKLE: ICD-10-CM

## 2020-06-08 DIAGNOSIS — M76.822 LEFT TIBIALIS POSTERIOR TENDONITIS: ICD-10-CM

## 2020-06-08 PROCEDURE — 73721 MRI JNT OF LWR EXTRE W/O DYE: CPT | Mod: LT

## 2020-06-09 ENCOUNTER — PRE VISIT (OUTPATIENT)
Dept: ORTHOPEDICS | Facility: CLINIC | Age: 20
End: 2020-06-09

## 2020-06-09 ENCOUNTER — OFFICE VISIT (OUTPATIENT)
Dept: ORTHOPEDICS | Facility: CLINIC | Age: 20
End: 2020-06-09
Attending: FAMILY MEDICINE
Payer: COMMERCIAL

## 2020-06-09 ENCOUNTER — TELEPHONE (OUTPATIENT)
Dept: ORTHOPEDICS | Facility: CLINIC | Age: 20
End: 2020-06-09

## 2020-06-09 VITALS — BODY MASS INDEX: 22.59 KG/M2 | WEIGHT: 152.5 LBS | HEIGHT: 69 IN

## 2020-06-09 DIAGNOSIS — M25.572 PAIN IN JOINT, ANKLE AND FOOT, LEFT: Primary | ICD-10-CM

## 2020-06-09 DIAGNOSIS — M76.822 LEFT TIBIALIS POSTERIOR TENDONITIS: ICD-10-CM

## 2020-06-09 ASSESSMENT — MIFFLIN-ST. JEOR: SCORE: 1516.12

## 2020-06-09 NOTE — LETTER
6/9/2020     RE: Berna Nichols  [address] [facility] [address]    Dear Colleague,    Thank you for referring your patient, Berna Nichols, to the [facility]. Please see a copy of my visit note below.    CHIEF COMPLAINT:  Left ankle pain and bursitis.      HISTORY OF PRESENT ILLNESS:  Ms. Nichols is a 20-year-old female who has graduated from our [employer].  The patient reports to have had a history of medial ankle bursitis and now more recently she has developed some pain and discomfort with some clicking along the posterior tibialis tendon.  This has been documented by ultrasound.      The patient has already tried 2 aspirations of the bursa and one of them was followed by a corticosteroid injection.  However, this has not been successful on reducing the size of the bursa.      The patient reports to have graduated from the [employer] team.  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  The patient believes that the season will extend until 03/2021.  She is very excited about the opportunity.      Presents today for discussion of treatment options.  Dr. Armas reached out to me yesterday to give introduction to her case as well.      PAST MEDICAL HISTORY:  None.      PAST SURGICAL HISTORY:  Reviewed today.      DRUG ALLERGIES:  None.      CURRENT MEDICATIONS:  Please refer to encounter form.      PHYSICAL EXAMINATION:  On today's visit, she presents as a pleasant female in no apparent distress with a height of 5 feet 9 inches and a weight of 152 pounds.  Denies to have any constitutional symptoms.      On today's visit, she presents with full range of motion of the left ankle, hindfoot and midfoot joints.  CMS intact.  Skin intact.  Presents with a soft tissue mass located along the medial aspect of the ankle joint, which is highly compatible with a medial ankle bursitis.  She also presents with some very subtle clicking of the posterior tibialis  tendon when contracted against resistance but clearly does not have a true subluxation of the tendon across the medial malleolus.      RADIOGRAPHIC EVALUATION:  I personally reviewed her MRI which is significant for showing a medial ankle bursitis.  The patient presents with some fluid along the tendon sheath and is debatable if she presents with any tendinosis or not.  There are no other acute findings.      ASSESSMENT:     1.  Left ankle medial bursitis.   2.  Left posterior tibialis tendinitis.      PLAN:  I discussed with the patient that at this point the most reliable option would be to proceed with surgical excision of the bursa and exploration of the posterior tibialis tendon.  I discussed with her the most likely postoperative course and complications from such intervention.      The patient contacted her mother via phone, and after a lengthy discussion, they opted to proceed with a conservative approach.  We will orchestrate for her to have an aspiration of the medial bursa followed by a heavy duty compression for 2 weeks in order to try to collapse the bursa and to make it scar down.  She will also require some scarring and the creation of some bleeding of the bursa during the aspiration, which hopefully will be accomplished by multiple perforations of the bursa.      I will contact Dr. Armas with regards to the plan moving forward.  In the meantime, she has no activity restrictions.  All questions were answered.      TT 30 minutes, CT 20 minutes.     Again, thank you for allowing me to participate in the care of your patient.      Sincerely,      Jaya Figueroa MD

## 2020-06-09 NOTE — TELEPHONE ENCOUNTER
Berna had to reschedule to 6/18 instead of 6/11 due to discussion with her .  I helped her find a spot at 1:20PM.     - Bert REYNOSO ATC

## 2020-06-09 NOTE — PROGRESS NOTES
CHIEF COMPLAINT:  Left ankle pain and bursitis.      HISTORY OF PRESENT ILLNESS:  Ms. Nichols is a 20-year-old female who has graduated from our [employer].  The patient reports to have had a history of medial ankle bursitis and now more recently she has developed some pain and discomfort with some clicking along the posterior tibialis tendon.  This has been documented by ultrasound.      The patient has already tried 2 aspirations of the bursa and one of them was followed by a corticosteroid injection.  However, this has not been successful on reducing the size of the bursa.      The patient reports to have graduated from the [employer] team.  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  The patient believes that the season will extend until 03/2021.  She is very excited about the opportunity.      Presents today for discussion of treatment options.  Dr. Armas reached out to me yesterday to give introduction to her case as well.      PAST MEDICAL HISTORY:  None.      PAST SURGICAL HISTORY:  Reviewed today.      DRUG ALLERGIES:  None.      CURRENT MEDICATIONS:  Please refer to encounter form.      PHYSICAL EXAMINATION:  On today's visit, she presents as a pleasant female in no apparent distress with a height of 5 feet 9 inches and a weight of 152 pounds.  Denies to have any constitutional symptoms.      On today's visit, she presents with full range of motion of the left ankle, hindfoot and midfoot joints.  CMS intact.  Skin intact.  Presents with a soft tissue mass located along the medial aspect of the ankle joint, which is highly compatible with a medial ankle bursitis.  She also presents with some very subtle clicking of the posterior tibialis tendon when contracted against resistance but clearly does not have a true subluxation of the tendon across the medial malleolus.      RADIOGRAPHIC EVALUATION:  I personally reviewed her MRI which is significant for showing a medial  ankle bursitis.  The patient presents with some fluid along the tendon sheath and is debatable if she presents with any tendinosis or not.  There are no other acute findings.      ASSESSMENT:     1.  Left ankle medial bursitis.   2.  Left posterior tibialis tendinitis.      PLAN:  I discussed with the patient that at this point the most reliable option would be to proceed with surgical excision of the bursa and exploration of the posterior tibialis tendon.  I discussed with her the most likely postoperative course and complications from such intervention.      The patient contacted her mother via phone, and after a lengthy discussion, they opted to proceed with a conservative approach.  We will orchestrate for her to have an aspiration of the medial bursa followed by a heavy duty compression for 2 weeks in order to try to collapse the bursa and to make it scar down.  She will also require some scarring and the creation of some bleeding of the bursa during the aspiration, which hopefully will be accomplished by multiple perforations of the bursa.      I will contact Dr. rAmas with regards to the plan moving forward.  In the meantime, she has no activity restrictions.  All questions were answered.      TT 30 minutes, CT 20 minutes.

## 2020-06-11 ENCOUNTER — TELEPHONE (OUTPATIENT)
Dept: ORTHOPEDICS | Facility: CLINIC | Age: 20
End: 2020-06-11

## 2020-06-11 NOTE — TELEPHONE ENCOUNTER
Due to schedule changes, we had to move Berna's appt for 1 hour later to 2:20PM on 6/18/20. This was not a problem for her, I thanked her for being flexible with her schedule.     - Bert REYNOSO ATC

## 2020-06-18 ENCOUNTER — OFFICE VISIT (OUTPATIENT)
Dept: ORTHOPEDICS | Facility: CLINIC | Age: 20
End: 2020-06-18
Payer: COMMERCIAL

## 2020-06-18 VITALS — WEIGHT: 152 LBS | HEIGHT: 69 IN | BODY MASS INDEX: 22.51 KG/M2

## 2020-06-18 DIAGNOSIS — M77.52 BURSITIS OF LEFT ANKLE: ICD-10-CM

## 2020-06-18 DIAGNOSIS — M76.822 LEFT TIBIALIS POSTERIOR TENDONITIS: Primary | ICD-10-CM

## 2020-06-18 RX ORDER — LIDOCAINE HYDROCHLORIDE 10 MG/ML
4 INJECTION, SOLUTION EPIDURAL; INFILTRATION; INTRACAUDAL; PERINEURAL
Status: SHIPPED | OUTPATIENT
Start: 2020-06-18

## 2020-06-18 RX ORDER — INDOMETHACIN 50 MG/1
50 CAPSULE ORAL
Qty: 30 CAPSULE | Refills: 0 | Status: SHIPPED | OUTPATIENT
Start: 2020-06-18 | End: 2020-06-28

## 2020-06-18 RX ORDER — TRIAMCINOLONE ACETONIDE 40 MG/ML
40 INJECTION, SUSPENSION INTRA-ARTICULAR; INTRAMUSCULAR
Status: SHIPPED | OUTPATIENT
Start: 2020-06-18

## 2020-06-18 RX ORDER — LIDOCAINE HYDROCHLORIDE 10 MG/ML
5 INJECTION, SOLUTION EPIDURAL; INFILTRATION; INTRACAUDAL; PERINEURAL
Status: SHIPPED | OUTPATIENT
Start: 2020-06-18

## 2020-06-18 RX ADMIN — LIDOCAINE HYDROCHLORIDE 4 ML: 10 INJECTION, SOLUTION EPIDURAL; INFILTRATION; INTRACAUDAL; PERINEURAL at 16:33

## 2020-06-18 RX ADMIN — TRIAMCINOLONE ACETONIDE 40 MG: 40 INJECTION, SUSPENSION INTRA-ARTICULAR; INTRAMUSCULAR at 16:33

## 2020-06-18 RX ADMIN — LIDOCAINE HYDROCHLORIDE 5 ML: 10 INJECTION, SOLUTION EPIDURAL; INFILTRATION; INTRACAUDAL; PERINEURAL at 16:33

## 2020-06-18 ASSESSMENT — MIFFLIN-ST. JEOR: SCORE: 1513.85

## 2020-06-18 NOTE — LETTER
"  6/18/2020      RE: Berna Nichols  [address] [facility] [address]       Subjective:   Berna Nichols is a 20 year old female who is here for follow up of left ankle medial malleolus bursitis.     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    She continues to have issues when she is wearing her ski boot when she is trying to cut with pain around the posterior aspect of her medial malleolus.  She denies this being because of pressure on the inflamed bursa.  She reports that it is deeper inside.  She is also been having issues running secondary to pain.  She is also wondering if she should be doing physical therapy to help.    PAST MEDICAL, SOCIAL, SURGICAL AND FAMILY HISTORY: She  has no past medical history of Arthritis, Bleeding disorder (H), Cancer (H), Cerebral infarction (H), Chronic kidney disease, Degenerative joint disease, Diabetes (H), Heart disease, Hepatitis, Hypertension, Surgical complication, or Uncomplicated asthma.  She  has a past surgical history that includes Arthroscopic reconstruction anterior cruciate ligament (Left, 12/21/2016); Arthroscopic reconstruction anterior cruciate ligament (Left, 12/21/2016); and Arthroscopy Knee With Meniscectomy (Left, 6/6/2018).  Her family history is not on file.  She reports that she has never smoked. She has never used smokeless tobacco. She reports that she does not drink alcohol or use drugs.    ALLERGIES: She has No Known Allergies.    CURRENT MEDICATIONS: She has a current medication list which includes the following prescription(s): indomethacin, norethindrone-ethinyl estradiol-iron, and mometasone, and the following Facility-Administered Medications: lidocaine and triamcinolone.     REVIEW OF SYSTEMS: 3 point review of systems is negative except as noted above.     Exam:   Ht 1.753 m (5' 9\")   Wt 68.9 kg (152 lb)   BMI 22.45 kg/m       Alert, NAD  NC/AT  Sclerae anicteric  Resp " nonlabored  Skin warm and dry  No focal neuro deficits. Speech intact.   Appropriate affect    Left ankle with promient, well circumscribed subcutaneous fluid collection directly over medial malleolus. Mildly TTP. Normal ankle ROM, but palpable snapping over posterior medial malleolus with resisted inversion of the foot.   CMS intact distally.     POCUS demonstrates loculated inflamed bursa over the medial malleolus.  Fluid around the posterior tibialis tendon.     Assessment/Plan:   1. Chronic bursitis of left ankle  2. New subluxing left tibialis posterior tendon with tendonitis    Aspiration of medial malleolus bursa was attempted but unsuccessful due to its chronicity and thick loculated nature.  Attempted to injection 2 mL of lidocaine to help with distention of bursa for successful aspiration, but this was also not successful.  CSI injection was injected into the medial malleolus bursa.    Left Medial malleolus bursa corticosteroid injection with MSK US guidance  After risks, benefits and complications of steroid injection were discussed with the patient elected to proceed.  Written informed consent was obtained. Using sterile technique, the area was first prepped with an chloraprep.  A 22 gauge, 1 1/2 inch needle was used to inject a solution of 40 mg kenalong and 4 cc of 1% lidocaine in the left medial malleolar bursa.  Pt.  tolerated the procedure well without complications.  Pt  had relief of his symptoms following the injection.  Patient injection instructions were given verbally.  Images were saved.    -CSI injection into medial malleolus bursa  -Indomethacin 50 mg TID for 10 days  -Physical therapy ordered  -Follow up in 4 weeks    Seen and discussed with Dr. Armas.     Gogo Dupont DO  Primary Care Sports Medicine Fellow    Medium Joint Injection/Arthrocentesis: L ankle    Date/Time: 6/18/2020 4:33 PM  Performed by: Gogo Dupont DO  Authorized by: Gogo Dupont DO     Indications:   Pain  Needle Size:  25 G  Guidance: ultrasound    Location:  Ankle  Location comment:  Medial malleolus bursa  Site:  L ankle  Medications:  4 mL lidocaine (PF) 1 %; 5 mL lidocaine (PF) 1 %; 40 mg triamcinolone 40 MG/ML  Outcome:  Tolerated well, no immediate complications  Procedure discussed: discussed risks, benefits, and alternatives    Consent Given by:  Patient  Timeout: timeout called immediately prior to procedure    Prep: patient was prepped and draped in usual sterile fashion     Scribed by Samantha Mead MS, LAT, ATC for Dr. Dupont/ on 6/18/2020 at 4:35 PM, based on the provider s statements to me.                      Attending Note:   I have personally examined this patient and have reviewed the clinical presentation and progress note with the fellow. I agree with the treatment plan as outlined. The plan was formulated with the fellow on the day of the patient's visit. I have reviewed all imaging with the fellow and agree with the findings in the documentation. I have supervised the injection procedure.      Brittny Armas MD, CAQ, CCD  [facility]  Sports Medicine and Bone Health    Gogo Dupont DO

## 2020-06-18 NOTE — PROGRESS NOTES
"Subjective:   Berna Nichols is a 20 year old female who is here for follow up of left ankle medial malleolus bursitis.     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    She continues to have issues when she is wearing her ski boot when she is trying to cut with pain around the posterior aspect of her medial malleolus.  She denies this being because of pressure on the inflamed bursa.  She reports that it is deeper inside.  She is also been having issues running secondary to pain.  She is also wondering if she should be doing physical therapy to help.    PAST MEDICAL, SOCIAL, SURGICAL AND FAMILY HISTORY: She  has no past medical history of Arthritis, Bleeding disorder (H), Cancer (H), Cerebral infarction (H), Chronic kidney disease, Degenerative joint disease, Diabetes (H), Heart disease, Hepatitis, Hypertension, Surgical complication, or Uncomplicated asthma.  She  has a past surgical history that includes Arthroscopic reconstruction anterior cruciate ligament (Left, 12/21/2016); Arthroscopic reconstruction anterior cruciate ligament (Left, 12/21/2016); and Arthroscopy Knee With Meniscectomy (Left, 6/6/2018).  Her family history is not on file.  She reports that she has never smoked. She has never used smokeless tobacco. She reports that she does not drink alcohol or use drugs.    ALLERGIES: She has No Known Allergies.    CURRENT MEDICATIONS: She has a current medication list which includes the following prescription(s): indomethacin, norethindrone-ethinyl estradiol-iron, and mometasone, and the following Facility-Administered Medications: lidocaine and triamcinolone.     REVIEW OF SYSTEMS: 3 point review of systems is negative except as noted above.     Exam:   Ht 1.753 m (5' 9\")   Wt 68.9 kg (152 lb)   BMI 22.45 kg/m       Alert, NAD  NC/AT  Sclerae anicteric  Resp nonlabored  Skin warm and dry  No focal neuro deficits. Speech intact. "   Appropriate affect    Left ankle with promient, well circumscribed subcutaneous fluid collection directly over medial malleolus. Mildly TTP. Normal ankle ROM, but palpable snapping over posterior medial malleolus with resisted inversion of the foot.   CMS intact distally.     POCUS demonstrates loculated inflamed bursa over the medial malleolus.  Fluid around the posterior tibialis tendon.     Assessment/Plan:   1. Chronic bursitis of left ankle  2. New subluxing left tibialis posterior tendon with tendonitis    Aspiration of medial malleolus bursa was attempted but unsuccessful due to its chronicity and thick loculated nature.  Attempted to injection 2 mL of lidocaine to help with distention of bursa for successful aspiration, but this was also not successful.  CSI injection was injected into the medial malleolus bursa.    Left Medial malleolus bursa corticosteroid injection with MSK US guidance  After risks, benefits and complications of steroid injection were discussed with the patient elected to proceed.  Written informed consent was obtained. Using sterile technique, the area was first prepped with an chloraprep.  A 22 gauge, 1 1/2 inch needle was used to inject a solution of 40 mg kenalong and 4 cc of 1% lidocaine in the left medial malleolar bursa.  Pt.  tolerated the procedure well without complications.  Pt  had relief of his symptoms following the injection.  Patient injection instructions were given verbally.  Images were saved.    -CSI injection into medial malleolus bursa  -Indomethacin 50 mg TID for 10 days  -Physical therapy ordered  -Follow up in 4 weeks    Seen and discussed with Dr. Armas.     Gogo Dupont DO  Primary Care Sports Medicine Fellow    Medium Joint Injection/Arthrocentesis: L ankle    Date/Time: 6/18/2020 4:33 PM  Performed by: Gogo Dupont DO  Authorized by: Gogo Dupont DO     Indications:  Pain  Needle Size:  25 G  Guidance: ultrasound    Location:  Ankle  Location  comment:  Medial malleolus bursa  Site:  L ankle  Medications:  4 mL lidocaine (PF) 1 %; 5 mL lidocaine (PF) 1 %; 40 mg triamcinolone 40 MG/ML  Outcome:  Tolerated well, no immediate complications  Procedure discussed: discussed risks, benefits, and alternatives    Consent Given by:  Patient  Timeout: timeout called immediately prior to procedure    Prep: patient was prepped and draped in usual sterile fashion     Scribed by Samantha Mead, MS, LAT, ATC for Dr. Dupont/ on 6/18/2020 at 4:35 PM, based on the provider s statements to me.

## 2020-06-18 NOTE — NURSING NOTE
[address] [address]  Dept: [phone]  ______________________________________________________________________________    Patient: Berna Nichols   : 2000   MRN: [id number]   2020    INVASIVE PROCEDURE SAFETY CHECKLIST    Date: 2020   Procedure: Left medial malleolus bursa kenalog injection  Patient Name: Berna Nichols  MRN: [id number]  YOB: 2000    Action: Complete sections as appropriate. Any discrepancy results in a HARD COPY until resolved.     PRE PROCEDURE:  Patient ID verified with 2 identifiers (name and  or MRN): Yes  Procedure and site verified with patient/designee (when able): Yes  Accurate consent documentation in medical record: Yes  H&P (or appropriate assessment) documented in medical record: Yes  H&P must be up to 20 days prior to procedure and updates within 24 hours of procedure as applicable: NA  Relevant diagnostic and radiology test results appropriately labeled and displayed as applicable: Yes  Procedure site(s) marked with provider initials: NA    TIMEOUT:  Time-Out performed immediately prior to starting procedure, including verbal and active participation of all team members addressing the following:Yes  * Correct patient identify  * Confirmed that the correct side and site are marked  * An accurate procedure consent form  * Agreement on the procedure to be done  * Correct patient position  * Relevant images and results are properly labeled and appropriately displayed  * The need to administer antibiotics or fluids for irrigation purposes during the procedure as applicable   * Safety precautions based on patient history or medication use    DURING PROCEDURE: Verification of correct person, site, and procedures any time the responsibility for care of the patient is transferred to another member of the care team.       Prior to injection, verified patient identity using patient's  name and date of birth.  Due to injection administration, patient instructed to remain in clinic for 15 minutes  afterwards, and to report any adverse reaction to me immediately.    Bursa injection was performed.      Drug Amount Wasted:  Yes: 1 mg/ml lidocaine  Vial/Syringe: Single dose vial  Expiration Date:  11/23      Samantha Mead, [facility]  June 18, 2020

## 2020-06-18 NOTE — PROGRESS NOTES
Attending Note:   I have personally examined this patient and have reviewed the clinical presentation and progress note with the fellow. I agree with the treatment plan as outlined. The plan was formulated with the fellow on the day of the patient's visit. I have reviewed all imaging with the fellow and agree with the findings in the documentation. I have supervised the injection procedure.      Brittny Armas MD, CAQ, CCD  [facility]  Sports Medicine and Bone Health

## 2020-06-23 ENCOUNTER — THERAPY VISIT (OUTPATIENT)
Dept: PHYSICAL THERAPY | Facility: CLINIC | Age: 20
End: 2020-06-23
Payer: COMMERCIAL

## 2020-06-23 DIAGNOSIS — M77.52 BURSITIS OF LEFT ANKLE: ICD-10-CM

## 2020-06-23 DIAGNOSIS — M76.822 LEFT TIBIALIS POSTERIOR TENDONITIS: ICD-10-CM

## 2020-06-23 PROCEDURE — 97110 THERAPEUTIC EXERCISES: CPT | Mod: GP | Performed by: PHYSICAL THERAPIST

## 2020-06-23 PROCEDURE — 97161 PT EVAL LOW COMPLEX 20 MIN: CPT | Mod: GP | Performed by: PHYSICAL THERAPIST

## 2020-06-23 NOTE — PROGRESS NOTES
[facility] Initial Evaluation  Subjective:  The history is provided by the patient. No  was used.   Therapist Generated HPI Evaluation         Type of problem:  Left ankle.    This is a new (May 2020) condition.  Occurance: Hit ankle on paddleboard.    Site of Pain: posterior to medial malleolus.    Pain radiates to:  No radiation.     Associated with: clicking, loss of strength  Exacerbated by: skating.  Relieved by: rest.                              Objective:  System    Ankle/Foot Evaluation  ROM:    AROM:    Dorsiflexion:  Left:   Wnl  Right:   Wnl  Plantarflexion:  Left:  Wnl    Right:  Wnl  Inversion:  Left:  Wnl     Right:  Wnl  Eversion:  Wnl     Right:  Wnl      PROM:              Great Toe Extension:  Left:    35 wtih first ray loaded      Right: 25 with first ray loaded        LIGAMENT TESTING:   Anterior Drawer (ATF) Left: pos and Gr II   Anterior Drawer (ATF) Right: neg  Posterior Drawer (PTF) Left: neg   Posterior Drawer (PTF) Right: neg  Varus Stress (Calc Fib) Left: neg    Varus Stress (Calc Fib) Right: neg        SPECIAL TESTS: not assessed    PALPATION: Palpation of ankle: proximal medial malleous as posterior tibialis muscle path.    EDEMA: Edema ankle: mild left medial malleous.          MOBILITY TESTING:   Tib-Fib Proximal Left: hypomobile      Tib-Fib Distal Left: hypomobile      Talocrural Left: normal      Subtalar Left: normal      Midtarsal Left: normal      First Ray Left: normal      FUNCTIONAL TESTS: not assessed                                                          mmt: gluteus medius: 4+/5 (L), 5/5 (R), hip extensors: 4+/5 (L), 5/5 (R), moderate left greater than right plantar fascia muscle tightness, decreased left greater that right first MTP joint mobility, passive knee extension: -3 (L) and 8 (R), history of left ACL Reconstruction 2016    General     ROS  IMPRESSION:   1. There is a mildly complex 3.1 x 1.0 x 1.8 cm fluid  collection  overlying the medial malleolus with some slightly thickened septae  likely representing adventitious bursitis.  2. Mild thickening and some discontinuity of the anterior talofibular  ligament likely the result of a prior injury/tear.  3. Mild tendinosis tibialis posterior tendon at the level of the tip  of the medial malleolus. There may be some mild longitudinal splitting  as well. No evidence of subluxation or dislocation. Overlying  retinaculum appears intact.      Assessment/Plan:    Patient is a 20 year old female with left side ankle complaints.    Patient has the following significant findings with corresponding treatment plan.                Diagnosis 1:  Left posterior tibailis tendonitis  Pain -  hot/cold therapy, manual therapy, self management, education, directional preference exercise and home program  Decreased ROM/flexibility - manual therapy, therapeutic exercise, therapeutic activity and home program  Decreased strength - therapeutic exercise, therapeutic activities and home program  Impaired muscle performance - neuro re-education and home program  Decreased function - therapeutic activities and home program    Therapy Evaluation Codes:   1) History comprised of:   Personal factors that impact the plan of care:      None.    Comorbidity factors that impact the plan of care are:      None.     Medications impacting care: None.  2) Examination of Body Systems comprised of:  3)     Stable/Uncomplicated.  4) Decision-Making    Low complexity using standardized patient assessment instrument and/or measureable assessment of functional outcome.  Cumulative Therapy Evaluation is: Low complexity.    Previous and current functional limitations:  (See Goal Flow Sheet for this information)    Short term and Long term goals: (See Goal Flow Sheet for this information)     Communication ability:  Patient appears to be able to clearly communicate and understand verbal and written communication and  follow directions correctly.  Treatment Explanation - The following has been discussed with the patient:   RX ordered/plan of care  Anticipated outcomes  Possible risks and side effects  This patient would benefit from PT intervention to resume normal activities.   Rehab potential is excellent.    Frequency:  2 X week, once daily  Duration:  for 6 weeks  Discharge Plan:  Achieve all LTG.  Independent in home treatment program.  Reach maximal therapeutic benefit.    Please refer to the daily flowsheet for treatment today, total treatment time and time spent performing 1:1 timed codes.

## 2020-06-23 NOTE — PROGRESS NOTES
[facility] Initial Evaluation  Subjective:    Patient Health History           General health as reported by patient is excellent.          Surgeries include:  Orthopedic surgery (left Meniscus/Left ACL ).    Current medications:  Anti-inflammatory.                                           Objective:  System    Physical Exam    General     ROS    Assessment/Plan:

## 2020-06-25 ENCOUNTER — THERAPY VISIT (OUTPATIENT)
Dept: PHYSICAL THERAPY | Facility: CLINIC | Age: 20
End: 2020-06-25
Payer: COMMERCIAL

## 2020-06-25 DIAGNOSIS — M76.822 LEFT TIBIALIS POSTERIOR TENDONITIS: Primary | ICD-10-CM

## 2020-06-25 PROCEDURE — 97110 THERAPEUTIC EXERCISES: CPT | Mod: GP | Performed by: PHYSICAL THERAPIST

## 2020-06-25 PROCEDURE — 97112 NEUROMUSCULAR REEDUCATION: CPT | Mod: GP | Performed by: PHYSICAL THERAPIST

## 2020-06-25 PROCEDURE — 97140 MANUAL THERAPY 1/> REGIONS: CPT | Mod: GP | Performed by: PHYSICAL THERAPIST

## 2020-06-30 ENCOUNTER — THERAPY VISIT (OUTPATIENT)
Dept: PHYSICAL THERAPY | Facility: CLINIC | Age: 20
End: 2020-06-30
Payer: COMMERCIAL

## 2020-06-30 DIAGNOSIS — M76.822 LEFT TIBIALIS POSTERIOR TENDONITIS: Primary | ICD-10-CM

## 2020-06-30 PROCEDURE — 97112 NEUROMUSCULAR REEDUCATION: CPT | Mod: GP | Performed by: PHYSICAL THERAPIST

## 2020-06-30 PROCEDURE — 97140 MANUAL THERAPY 1/> REGIONS: CPT | Mod: GP | Performed by: PHYSICAL THERAPIST

## 2020-07-02 ENCOUNTER — THERAPY VISIT (OUTPATIENT)
Dept: PHYSICAL THERAPY | Facility: CLINIC | Age: 20
End: 2020-07-02
Payer: COMMERCIAL

## 2020-07-02 DIAGNOSIS — M76.822 LEFT TIBIALIS POSTERIOR TENDONITIS: Primary | ICD-10-CM

## 2020-07-02 PROCEDURE — 97140 MANUAL THERAPY 1/> REGIONS: CPT | Mod: GP | Performed by: PHYSICAL THERAPIST

## 2020-07-02 PROCEDURE — 97112 NEUROMUSCULAR REEDUCATION: CPT | Mod: GP | Performed by: PHYSICAL THERAPIST

## 2020-07-30 DIAGNOSIS — Z30.9 ENCOUNTER FOR CONTRACEPTIVE MANAGEMENT, UNSPECIFIED TYPE: Primary | ICD-10-CM

## 2020-07-30 DIAGNOSIS — M25.579 PAIN IN JOINT INVOLVING ANKLE AND FOOT, UNSPECIFIED LATERALITY: ICD-10-CM

## 2020-07-30 RX ORDER — NORETHINDRONE ACETATE AND ETHINYL ESTRADIOL AND FERROUS FUMARATE 5-7-9-7
1 KIT ORAL DAILY
Qty: 224 TABLET | Refills: 1 | Status: SHIPPED | OUTPATIENT
Start: 2020-07-30

## 2020-07-30 RX ORDER — LIDOCAINE 4 G/G
1 PATCH TOPICAL EVERY 24 HOURS
Qty: 30 PATCH | Refills: 11 | Status: SHIPPED | OUTPATIENT
Start: 2020-07-30

## 2020-08-11 DIAGNOSIS — Z30.9 ENCOUNTER FOR CONTRACEPTIVE MANAGEMENT, UNSPECIFIED TYPE: Primary | ICD-10-CM

## 2020-08-11 RX ORDER — NORGESTIMATE AND ETHINYL ESTRADIOL 7DAYSX3 28
1 KIT ORAL DAILY
Qty: 224 TABLET | Refills: 1 | Status: SHIPPED | OUTPATIENT
Start: 2020-08-11

## 2020-11-02 NOTE — NURSING NOTE
"Reason For Visit:   Chief Complaint   Patient presents with     Consult     Left tibialis posterior tendonitis       Ht 1.753 m (5' 9\")   Wt 69.2 kg (152 lb 8 oz)   BMI 22.52 kg/m      Pain Assessment  Patient Currently in Pain: Ginoies    Sarah Padgett ATC    " No

## 2020-12-27 ENCOUNTER — HEALTH MAINTENANCE LETTER (OUTPATIENT)
Age: 20
End: 2020-12-27

## 2021-04-24 ENCOUNTER — HEALTH MAINTENANCE LETTER (OUTPATIENT)
Age: 21
End: 2021-04-24

## 2021-10-09 ENCOUNTER — HEALTH MAINTENANCE LETTER (OUTPATIENT)
Age: 21
End: 2021-10-09

## 2022-05-16 ENCOUNTER — HEALTH MAINTENANCE LETTER (OUTPATIENT)
Age: 22
End: 2022-05-16

## 2022-09-11 ENCOUNTER — HEALTH MAINTENANCE LETTER (OUTPATIENT)
Age: 22
End: 2022-09-11

## 2023-06-03 ENCOUNTER — HEALTH MAINTENANCE LETTER (OUTPATIENT)
Age: 23
End: 2023-06-03

## (undated) DEVICE — NDL 18GAX1.5" 305185

## (undated) DEVICE — SUCTION MANIFOLD NEPTUNE 2 SYS 4 PORT 0702-020-000

## (undated) DEVICE — LINEN TOWEL PACK X5 5464

## (undated) DEVICE — PACK ARTHROSCOPY CUSTOM ASC

## (undated) DEVICE — SU ETHILON 3-0 PS-1 18" 1663G

## (undated) DEVICE — PREP DURAPREP 26ML APL 8630

## (undated) DEVICE — GLOVE BIOGEL PI SZ 7.5 40875

## (undated) DEVICE — TUBING SYSTEM ARTHREX PATIENT REDEUCE AR-6421

## (undated) DEVICE — DRSG STERI STRIP 1/2X4" R1547

## (undated) DEVICE — SYR 30ML LL W/O NDL 302832

## (undated) DEVICE — DRAPE STERI U 1015

## (undated) DEVICE — SOL NACL 0.9% IRRIG 3000ML BAG 2B7477

## (undated) DEVICE — LINEN DRAPE 54X72" 5467

## (undated) RX ORDER — LIDOCAINE HYDROCHLORIDE 10 MG/ML
INJECTION, SOLUTION EPIDURAL; INFILTRATION; INTRACAUDAL; PERINEURAL
Status: DISPENSED
Start: 2020-06-18

## (undated) RX ORDER — ONDANSETRON 2 MG/ML
INJECTION INTRAMUSCULAR; INTRAVENOUS
Status: DISPENSED
Start: 2018-06-06

## (undated) RX ORDER — GABAPENTIN 300 MG/1
CAPSULE ORAL
Status: DISPENSED
Start: 2018-06-06

## (undated) RX ORDER — LIDOCAINE HYDROCHLORIDE 10 MG/ML
INJECTION, SOLUTION INFILTRATION; PERINEURAL
Status: DISPENSED
Start: 2017-12-14

## (undated) RX ORDER — ACETAMINOPHEN 325 MG/1
TABLET ORAL
Status: DISPENSED
Start: 2018-06-06

## (undated) RX ORDER — LIDOCAINE HYDROCHLORIDE 10 MG/ML
INJECTION, SOLUTION EPIDURAL; INFILTRATION; INTRACAUDAL; PERINEURAL
Status: DISPENSED
Start: 2020-06-05

## (undated) RX ORDER — DEXAMETHASONE SODIUM PHOSPHATE 4 MG/ML
INJECTION, SOLUTION INTRA-ARTICULAR; INTRALESIONAL; INTRAMUSCULAR; INTRAVENOUS; SOFT TISSUE
Status: DISPENSED
Start: 2018-06-06

## (undated) RX ORDER — TRIAMCINOLONE ACETONIDE 40 MG/ML
INJECTION, SUSPENSION INTRA-ARTICULAR; INTRAMUSCULAR
Status: DISPENSED
Start: 2020-06-18

## (undated) RX ORDER — PROPOFOL 10 MG/ML
INJECTION, EMULSION INTRAVENOUS
Status: DISPENSED
Start: 2018-06-06

## (undated) RX ORDER — MORPHINE SULFATE 0.5 MG/ML
INJECTION, SOLUTION EPIDURAL; INTRATHECAL; INTRAVENOUS
Status: DISPENSED
Start: 2018-06-06

## (undated) RX ORDER — FENTANYL CITRATE 50 UG/ML
INJECTION, SOLUTION INTRAMUSCULAR; INTRAVENOUS
Status: DISPENSED
Start: 2018-06-06

## (undated) RX ORDER — KETOROLAC TROMETHAMINE 30 MG/ML
INJECTION, SOLUTION INTRAMUSCULAR; INTRAVENOUS
Status: DISPENSED
Start: 2018-06-06

## (undated) RX ORDER — LIDOCAINE HYDROCHLORIDE 20 MG/ML
INJECTION, SOLUTION EPIDURAL; INFILTRATION; INTRACAUDAL; PERINEURAL
Status: DISPENSED
Start: 2018-06-06